# Patient Record
Sex: FEMALE | Race: WHITE | HISPANIC OR LATINO | Employment: FULL TIME | URBAN - METROPOLITAN AREA
[De-identification: names, ages, dates, MRNs, and addresses within clinical notes are randomized per-mention and may not be internally consistent; named-entity substitution may affect disease eponyms.]

---

## 2017-01-03 ENCOUNTER — HOSPITAL ENCOUNTER (EMERGENCY)
Facility: MEDICAL CENTER | Age: 27
End: 2017-01-03
Attending: EMERGENCY MEDICINE
Payer: COMMERCIAL

## 2017-01-03 ENCOUNTER — APPOINTMENT (OUTPATIENT)
Dept: RADIOLOGY | Facility: MEDICAL CENTER | Age: 27
End: 2017-01-03
Attending: EMERGENCY MEDICINE
Payer: COMMERCIAL

## 2017-01-03 VITALS
HEIGHT: 64 IN | SYSTOLIC BLOOD PRESSURE: 135 MMHG | WEIGHT: 145.06 LBS | RESPIRATION RATE: 16 BRPM | OXYGEN SATURATION: 96 % | TEMPERATURE: 98.5 F | DIASTOLIC BLOOD PRESSURE: 77 MMHG | BODY MASS INDEX: 24.77 KG/M2 | HEART RATE: 75 BPM

## 2017-01-03 DIAGNOSIS — O20.0 THREATENED MISCARRIAGE IN EARLY PREGNANCY: ICD-10-CM

## 2017-01-03 LAB
ANION GAP SERPL CALC-SCNC: 9 MMOL/L (ref 0–11.9)
APPEARANCE UR: CLEAR
B-HCG SERPL-ACNC: 110.4 MIU/ML (ref 0–10)
BACTERIA #/AREA URNS HPF: ABNORMAL /HPF
BASOPHILS # BLD AUTO: 0.3 % (ref 0–1.8)
BASOPHILS # BLD: 0.03 K/UL (ref 0–0.12)
BILIRUB UR QL STRIP.AUTO: NEGATIVE
BUN SERPL-MCNC: 5 MG/DL (ref 8–22)
CALCIUM SERPL-MCNC: 8.8 MG/DL (ref 8.4–10.2)
CHLORIDE SERPL-SCNC: 106 MMOL/L (ref 96–112)
CO2 SERPL-SCNC: 24 MMOL/L (ref 20–33)
COLOR UR: YELLOW
CREAT SERPL-MCNC: 0.77 MG/DL (ref 0.5–1.4)
CULTURE IF INDICATED INDCX: NO UA CULTURE
EOSINOPHIL # BLD AUTO: 0.12 K/UL (ref 0–0.51)
EOSINOPHIL NFR BLD: 1.3 % (ref 0–6.9)
EPI CELLS #/AREA URNS HPF: ABNORMAL /HPF
ERYTHROCYTE [DISTWIDTH] IN BLOOD BY AUTOMATED COUNT: 40.2 FL (ref 35.9–50)
GFR SERPL CREATININE-BSD FRML MDRD: >60 ML/MIN/1.73 M 2
GLUCOSE SERPL-MCNC: 89 MG/DL (ref 65–99)
GLUCOSE UR STRIP.AUTO-MCNC: NEGATIVE MG/DL
HCT VFR BLD AUTO: 41.3 % (ref 37–47)
HGB BLD-MCNC: 14.3 G/DL (ref 12–16)
IMM GRANULOCYTES # BLD AUTO: 0.02 K/UL (ref 0–0.11)
IMM GRANULOCYTES NFR BLD AUTO: 0.2 % (ref 0–0.9)
KETONES UR STRIP.AUTO-MCNC: NEGATIVE MG/DL
LEUKOCYTE ESTERASE UR QL STRIP.AUTO: NEGATIVE
LYMPHOCYTES # BLD AUTO: 2.42 K/UL (ref 1–4.8)
LYMPHOCYTES NFR BLD: 25.6 % (ref 22–41)
MCH RBC QN AUTO: 30.4 PG (ref 27–33)
MCHC RBC AUTO-ENTMCNC: 34.6 G/DL (ref 33.6–35)
MCV RBC AUTO: 87.9 FL (ref 81.4–97.8)
MICRO URNS: ABNORMAL
MONOCYTES # BLD AUTO: 0.68 K/UL (ref 0–0.85)
MONOCYTES NFR BLD AUTO: 7.2 % (ref 0–13.4)
MUCOUS THREADS #/AREA URNS HPF: ABNORMAL /HPF
NEUTROPHILS # BLD AUTO: 6.2 K/UL (ref 2–7.15)
NEUTROPHILS NFR BLD: 65.4 % (ref 44–72)
NITRITE UR QL STRIP.AUTO: NEGATIVE
NRBC # BLD AUTO: 0 K/UL
NRBC BLD AUTO-RTO: 0 /100 WBC
NUMBER OF RH DOSES IND 8505RD: NORMAL
PH UR STRIP.AUTO: 5.5 [PH]
PLATELET # BLD AUTO: 301 K/UL (ref 164–446)
PMV BLD AUTO: 10 FL (ref 9–12.9)
POTASSIUM SERPL-SCNC: 3.6 MMOL/L (ref 3.6–5.5)
PROT UR QL STRIP: NEGATIVE MG/DL
RBC # BLD AUTO: 4.7 M/UL (ref 4.2–5.4)
RBC # URNS HPF: ABNORMAL /HPF
RBC UR QL AUTO: ABNORMAL
RH BLD: NORMAL
SODIUM SERPL-SCNC: 139 MMOL/L (ref 135–145)
SP GR UR STRIP.AUTO: 1.02
WBC # BLD AUTO: 9.5 K/UL (ref 4.8–10.8)
WBC #/AREA URNS HPF: ABNORMAL /HPF

## 2017-01-03 PROCEDURE — 76830 TRANSVAGINAL US NON-OB: CPT

## 2017-01-03 PROCEDURE — 86901 BLOOD TYPING SEROLOGIC RH(D): CPT

## 2017-01-03 PROCEDURE — 36415 COLL VENOUS BLD VENIPUNCTURE: CPT

## 2017-01-03 PROCEDURE — 81001 URINALYSIS AUTO W/SCOPE: CPT

## 2017-01-03 PROCEDURE — 99284 EMERGENCY DEPT VISIT MOD MDM: CPT

## 2017-01-03 PROCEDURE — 85025 COMPLETE CBC W/AUTO DIFF WBC: CPT

## 2017-01-03 PROCEDURE — 84702 CHORIONIC GONADOTROPIN TEST: CPT

## 2017-01-03 PROCEDURE — 80048 BASIC METABOLIC PNL TOTAL CA: CPT

## 2017-01-03 ASSESSMENT — PAIN SCALES - GENERAL: PAINLEVEL_OUTOF10: ASSUMED PAIN PRESENT

## 2017-01-03 NOTE — ED AVS SNAPSHOT
AgLocal Access Code: Activation code not generated  Current AgLocal Status: Active    [employer]  A secure, online tool to manage your health information     [employer]’s AgLocal® is a secure, online tool that connects you to your personalized health information from the privacy of your home -- day or night - making it very easy for you to manage your healthcare. Once the activation process is completed, you can even access your medical information using the AgLocal ko, which is available for free in the Apple Ko store or Google Play store.     AgLocal provides the following levels of access (as shown below):   My Chart Features   [facility] Primary Care Doctor [facility]  Specialists [facility]  Urgent  Care Non-[facility]  Primary Care  Doctor   Email your healthcare team securely and privately 24/7 X X X X   Manage appointments: schedule your next appointment; view details of past/upcoming appointments X      Request prescription refills. X      View recent personal medical records, including lab and immunizations X X X X   View health record, including health history, allergies, medications X X X X   Read reports about your outpatient visits, procedures, consult and ER notes X X X X   See your discharge summary, which is a recap of your hospital and/or ER visit that includes your diagnosis, lab results, and care plan. X X       How to register for AgLocal:  1. Go to  [email or web address].  2. Click on the Sign Up Now box, which takes you to the New Member Sign Up page. You will need to provide the following information:  a. Enter your AgLocal Access Code exactly as it appears at the top of this page. (You will not need to use this code after you’ve completed the sign-up process. If you do not sign up before the expiration date, you must request a new code.)   b. Enter your date of birth.   c. Enter your home email address.   d. Click Submit, and follow the next screen’s instructions.  3. Create a AgLocal ID. This will  be your Oscar login ID and cannot be changed, so think of one that is secure and easy to remember.  4. Create a Oscar password. You can change your password at any time.  5. Enter your Password Reset Question and Answer. This can be used at a later time if you forget your password.   6. Enter your e-mail address. This allows you to receive e-mail notifications when new information is available in Oscar.  7. Click Sign Up. You can now view your health information.    For assistance activating your Oscar account, call [phone]

## 2017-01-03 NOTE — ED AVS SNAPSHOT
After Visit Summary                                                                                                                Carol Buitrago   MRN: [id number]    Department:  [facility], Emergency Dept   Date of Visit:  1/3/2017            [facility], Emergency Dept    [address]    [place] [address]    Phone:  [phone]      You were seen by     Isrrael Hernandez M.D.      Your Diagnosis Was     Threatened miscarriage in early pregnancy     O20.0       Follow-up Information     1. Follow up with Skip Saxena M.D.. Schedule an appointment as soon as possible for a visit in 2 days.    Specialty:  OB/Gyn    Contact information    [address]  [place] [address] [phone]        Medication Information     Review all of your home medications and newly ordered medications with your primary doctor and/or pharmacist as soon as possible. Follow medication instructions as directed by your doctor and/or pharmacist.     Please keep your complete medication list with you and share with your physician. Update the information when medications are discontinued, doses are changed, or new medications (including over-the-counter products) are added; and carry medication information at all times in the event of emergency situations.               Medication List      ASK your doctor about these medications        Instructions    SRONYX 0.1-20 MG-MCG per tablet   Generic drug:  levonorgestrel-ethinyl estradiol    Take 1 Tab by mouth every day.   Dose:  1 Tab               Procedures and tests performed during your visit     BASIC METABOLIC PANEL    CBC WITH DIFFERENTIAL    ESTIMATED GFR    HCG QUANTITATIVE SERUM    IV Saline Lock    RH TYPE FOR RHOGAM FROM E.D.    Set Up for Pelvic Exam    URINALYSIS,CULTURE IF INDICATED    URINE MICROSCOPIC (W/UA)    US-GYN-PELVIS TRANSVAGINAL        Discharge Instructions       Threatened Miscarriage  A threatened  miscarriage is when you have vaginal bleeding during your first 20 weeks of pregnancy but the pregnancy has not ended. Your doctor will do tests to make sure you are still pregnant. The cause of the bleeding may not be known. This condition does not mean your pregnancy will end. It does increase the risk of it ending (complete miscarriage).  HOME CARE   · Make sure you keep all your doctor visits for prenatal care.  · Get plenty of rest.  · Do not have sex or use tampons if you have vaginal bleeding.  · Do not douche.  · Do not smoke or use drugs.  · Do not drink alcohol.  · Avoid caffeine.  GET HELP IF:  · You have light bleeding from your vagina.  · You have belly pain or cramping.  · You have a fever.  GET HELP RIGHT AWAY IF:   · You have heavy bleeding from your vagina.  · You have clots of blood coming from your vagina.  · You have bad pain or cramps in your low back or belly.  · You have fever, chills, and bad belly pain.  MAKE SURE YOU:   · Understand these instructions.  · Will watch your condition.  · Will get help right away if you are not doing well or get worse.     This information is not intended to replace advice given to you by your health care provider. Make sure you discuss any questions you have with your health care provider.     Document Released: 11/30/2009 Document Revised: 12/23/2014 Document Reviewed: 10/14/2014  [employer] Interactive Patient Education ©2016 [employer]    Vaginal Bleeding During Pregnancy, First Trimester  A small amount of bleeding (spotting) from the vagina is relatively common in early pregnancy. It usually stops on its own. Various things may cause bleeding or spotting in early pregnancy. Some bleeding may be related to the pregnancy, and some may not. In most cases, the bleeding is normal and is not a problem. However, bleeding can also be a sign of something serious. Be sure to tell your health care provider about any vaginal bleeding right away.  Some possible  causes of vaginal bleeding during the first trimester include:  · Infection or inflammation of the cervix.  · Growths (polyps) on the cervix.  · Miscarriage or threatened miscarriage.  · Pregnancy tissue has developed outside of the uterus and in a fallopian tube (tubal pregnancy).  · Tiny cysts have developed in the uterus instead of pregnancy tissue (molar pregnancy).  HOME CARE INSTRUCTIONS   Watch your condition for any changes. The following actions may help to lessen any discomfort you are feeling:  · Follow your health care provider's instructions for limiting your activity. If your health care provider orders bed rest, you may need to stay in bed and only get up to use the bathroom. However, your health care provider may allow you to continue light activity.  · If needed, make plans for someone to help with your regular activities and responsibilities while you are on bed rest.  · Keep track of the number of pads you use each day, how often you change pads, and how soaked (saturated) they are. Write this down.  · Do not use tampons. Do not douche.  · Do not have sexual intercourse or orgasms until approved by your health care provider.  · If you pass any tissue from your vagina, save the tissue so you can show it to your health care provider.  · Only take over-the-counter or prescription medicines as directed by your health care provider.  · Do not take aspirin because it can make you bleed.  · Keep all follow-up appointments as directed by your health care provider.  SEEK MEDICAL CARE IF:  · You have any vaginal bleeding during any part of your pregnancy.  · You have cramps or labor pains.  · You have a fever, not controlled by medicine.  SEEK IMMEDIATE MEDICAL CARE IF:   · You have severe cramps in your back or belly (abdomen).  · You pass large clots or tissue from your vagina.  · Your bleeding increases.  · You feel light-headed or weak, or you have fainting episodes.  · You have chills.  · You are  leaking fluid or have a gush of fluid from your vagina.  · You pass out while having a bowel movement.  MAKE SURE YOU:  · Understand these instructions.  · Will watch your condition.  · Will get help right away if you are not doing well or get worse.     This information is not intended to replace advice given to you by your health care provider. Make sure you discuss any questions you have with your health care provider.     Document Released: 2006 Document Revised: 2014 Document Reviewed: 2014  [employer] Interactive Patient Education ©6 [employer]    Pelvic Rest  Pelvic rest is sometimes recommended for women when:   · The placenta is partially or completely covering the opening of the cervix (placenta previa).  · There is bleeding between the uterine wall and the amniotic sac in the first trimester (subchorionic hemorrhage).  · The cervix begins to open without labor starting (incompetent cervix, cervical insufficiency).  · The labor is too early ( labor).  HOME CARE INSTRUCTIONS  · Do not have sexual intercourse, stimulation, or an orgasm.  · Do not use tampons, douche, or put anything in the vagina.  · Do not lift anything over 10 pounds (4.5 kg).  · Avoid strenuous activity or straining your pelvic muscles.  SEEK MEDICAL CARE IF:   · You have any vaginal bleeding during pregnancy. Treat this as a potential emergency.  · You have cramping pain felt low in the stomach (stronger than menstrual cramps).  · You notice vaginal discharge (watery, mucus, or bloody).  · You have a low, dull backache.  · There are regular contractions or uterine tightening.  SEEK IMMEDIATE MEDICAL CARE IF:  You have vaginal bleeding and have placenta previa.      This information is not intended to replace advice given to you by your health care provider. Make sure you discuss any questions you have with your health care provider.     Document Released: 2012 Document Revised: 2013 Document  Reviewed: 04/13/2012  [employer] Interactive Patient Education ©2016 [employer]            Patient Information     Patient Information    Following emergency treatment: all patient requiring follow-up care must return either to a private physician or a clinic if your condition worsens before you are able to obtain further medical attention, please return to the emergency room.     Billing Information    At [facility], we work to make the billing process streamlined for our patients.  Our Representatives are here to answer any questions you may have regarding your hospital bill.  If you have insurance coverage and have supplied your insurance information to us, we will submit a claim to your insurer on your behalf.  Should you have any questions regarding your bill, we can be reached online or by phone as follows:  Online: You are able pay your bills online or live chat with our representatives about any billing questions you may have. We are here to help Monday - Friday from 8:00am to 7:30pm and 9:00am - 12:00pm on Saturdays.  Please visit [email or web address]  for more information.   Phone:  [phone] or [phone]    Please note that your emergency physician, surgeon, pathologist, radiologist, anesthesiologist, and other specialists are not employed by [facility] and will therefore bill separately for their services.  Please contact them directly for any questions concerning their bills at the numbers below:     Emergency Physician Services:  [phone]  [facility]:  [phone]  [facility]:  [phone]  [facility]:  [phone]    1. Your final bill may vary from the amount quoted upon discharge if all procedures are not complete at that time, or if your doctor has additional procedures of which we are not aware. You will receive an additional bill if you return to the Emergency Department at [facility] for suture  removal regardless of the facility of which the sutures were placed.     2. Please arrange for settlement of this account at the emergency registration.    3. All self-pay accounts are due in full at the time of treatment.  If you are unable to meet this obligation then payment is expected within 4-5 days.     4. If you have had radiology studies (CT, X-ray, Ultrasound, MRI), you have received a preliminary result during your emergency department visit. Please contact the radiology department [phone] to receive a copy of your final result. Please discuss the Final result with your primary physician or with the follow up physician provided.     Crisis Hotline:  [place] Crisis Hotline:  6-230-TUDSENK or 1-944.685.3319  [place] Crisis Hotline:    1-225.335.3559 or 328-939-8019         ED Discharge Follow Up Questions    1. In order to provide you with very good care, we would like to follow up with a phone call in the next few days.  May we have your permission to contact you?     YES /  NO    2. What is the best phone number to call you? (       )_____-__________    3. What is the best time to call you?      Morning  /  Afternoon  /  Evening                   Patient Signature:  ____________________________________________________________    Date:  ____________________________________________________________      Your appointments     Jan 30, 2017  4:15 PM   GYN Visit with Desiree Brooks M.D.   [facility] ([address])    [address] [place] [address], [address]  [place] [address]   [phone]

## 2017-01-03 NOTE — ED AVS SNAPSHOT
1/3/2017          Carol Buitrago  [address] [place] [address]    Dear Carol:    [facility] wants to ensure your discharge home is safe and you or your loved ones have had all your questions answered regarding your care after you leave the hospital.    You may receive a telephone call within two days of your discharge.  This call is to make certain you understand your discharge instructions as well as ensure we provided you with the best care possible during your stay with us.     The call will only last approximately 3-5 minutes and will be done by a nurse.    Once again, we want to ensure your discharge home is safe and that you have a clear understanding of any next steps in your care.  If you have any questions or concerns, please do not hesitate to contact us, we are here for you.  Thank you for choosing [facility] for your healthcare needs.    Sincerely,    Fabrizio Mazariegos    [facility]

## 2017-01-04 NOTE — ED NOTES
Dc instructions given . Pt to f/u with ob as well as have labs drawn prior . To return to regional for continued bleeding

## 2017-01-04 NOTE — ED PROVIDER NOTES
"ED Provider Note    CHIEF COMPLAINT  Chief Complaint   Patient presents with   • Pregnancy   • Vaginal Bleeding   • Cramping       HPI  Carol Buitrago is a 26 y.o. female who presents with vaginal bleeding and pain. The patient states that she is pregnant. Her last menstrual period was .  Having cramping and bleeding.      Last menstrual period       REVIEW OF SYSTEMS  CONSTITUTIONAL:  Denies fever, chills, weight gain or weight loss or weakness  EYES:  Denies photophobia   ENT:  Denies sore throat,  CARDIOVASCULAR:  Denies chest pain, palpitations  RESPIRATORY:  Denies cough or shortness of breath or difficulty breathing  GI: Positive suprapubic pain but no upper abdominal pain. No vomiting or diarrhea.  Genitourinary: Positive vaginal bleeding   MUSCULOSKELETAL:  Denies weakness joint swelling or back pain  SKIN:  No rash  ALLERGIC: No itchy rashes.  NEUROLOGIC:  Denies headache  PSYCHIATRIC:  Denies depression, suicidal ideation or homicial ideation      PAST MEDICAL HISTORY    1 miscarriage  1 therapeutic     FAMILY HISTORY  Family History   Problem Relation Age of Onset   • Hypertension Father        SOCIAL HISTORY   reports that she has never smoked. She does not have any smokeless tobacco history on file. She reports that she drinks alcohol. She reports that she does not use illicit drugs.    SURGICAL HISTORY  History reviewed. No pertinent past surgical history.    CURRENT MEDICATIONS  No current facility-administered medications for this encounter.    Current outpatient prescriptions:   •  levonorgestrel-ethinyl estradiol (SRONYX) 0.1-20 MG-MCG per tablet, Take 1 Tab by mouth every day., Disp: , Rfl:       ALLERGIES  No Known Allergies    PHYSICAL EXAM  VITAL SIGNS: /77 mmHg  Pulse 73  Temp(Src) 36.9 °C (98.5 °F)  Resp 16  Ht 1.626 m (5' 4\")  Wt 65.8 kg (145 lb 1 oz)  BMI 24.89 kg/m2  SpO2 100%  LMP 2016     Constitutional: Patient is awake alert " person place and time. No acute respiratory distress Well developed, Well nourished, Non-toxic appearance.   HENT: Normocephalic, Atraumatic,  Bilateral external ears normal  Eyes:  Sclera and conjunctiva clear, No discharge.   Neck:  Supple no nuchal rigidity, no thyromegaly or mass. Non-tender  Lymphatic: No supraclavicular  Cardiovascular: Heart is regular rate and rhythm no murmur  Thorax & Lungs: Chest is symmetrical, with good breath sounds. No wheezing or crackles. No respiratory distress,  Abdomen:  Soft, No tenderness no hepatosplenomegaly there is no guarding or rebound, No masses, No pulsatile masses.  Skin: Warm, Dry, no petechia, purpura or rash.   Back: Non tender with palpation  Genitalia:  Pelvic exam with a female nurse present. External female genitalia normal. Vault small amount of blood. Cervical os closed.  Extremities: No  edema.  Non tender.   Musculoskeletal: Good range of motion of her lower extremities  Neurologic: Alert & oriented   Strength is symmetrical in the upper and lower extremities        LABS:  Lab Results   Component Value Date    WBC 9.5 2017    HEMOGLOBIN 14.3 2017    HEMATOCRIT 41.3 2017    PLATELETCT 301 2017    SODIUM 139 2017    POTASSIUM 3.6 2017    CHLORIDE 106 2017    CO2 24 2017    BUN 5* 2017    GLUCOSE 89 2017     Quantitative beta hCG 110  Rh+  Urine blood only no signs of infection    RADIOLOGY/PROCEDURES  US-GYN-PELVIS TRANSVAGINAL   Final Result      Echogenic material in the endocervical canal most likely indicates hemorrhage, favored over polyp.      No intrauterine pregnancy is identified.  Differential considerations include missed , early gestation and nonvisualized ectopic pregnancy.  Consequently, close clinical monitoring is advised.            COURSE & MEDICAL DECISION MAKING  Pertinent Labs & Imaging studies reviewed. (See chart for details)  Differential diagnosis includes but not  limited to ectopic pregnancy threatened miscarriage, blighted ovum.    Patient with a cervix is closed. Ultrasound shows no obvious intrauterine pregnancy. Her quantitative hCG is 110. This really does not correlate with a last menstrual period of November 11. Concern of course that she's had a miscarriage or is about to have one. At this time I believe she is stable to be discharged home for close follow-up. I have  Discussed the case with Dr. Saxena. They will see her in follow-up in the office.    FINAL IMPRESSION  1. Threatened miscarriage versus early ectopic pregnancy       PLAN  1. Follow up Dr. Saxena call tomorrow for an appointment within 2 days  2. Repeat quantitative hCG in 48 hours  3. Right miscarriage information sheet  4. Return to the emergency department for increased pains, fevers, vomiting or change in condition.  Electronically signed by: Isrrael Hernandez, 1/3/2017 7:35 PM

## 2017-01-04 NOTE — DISCHARGE INSTRUCTIONS
Threatened Miscarriage  A threatened miscarriage is when you have vaginal bleeding during your first 20 weeks of pregnancy but the pregnancy has not ended. Your doctor will do tests to make sure you are still pregnant. The cause of the bleeding may not be known. This condition does not mean your pregnancy will end. It does increase the risk of it ending (complete miscarriage).  HOME CARE   · Make sure you keep all your doctor visits for prenatal care.  · Get plenty of rest.  · Do not have sex or use tampons if you have vaginal bleeding.  · Do not douche.  · Do not smoke or use drugs.  · Do not drink alcohol.  · Avoid caffeine.  GET HELP IF:  · You have light bleeding from your vagina.  · You have belly pain or cramping.  · You have a fever.  GET HELP RIGHT AWAY IF:   · You have heavy bleeding from your vagina.  · You have clots of blood coming from your vagina.  · You have bad pain or cramps in your low back or belly.  · You have fever, chills, and bad belly pain.  MAKE SURE YOU:   · Understand these instructions.  · Will watch your condition.  · Will get help right away if you are not doing well or get worse.     This information is not intended to replace advice given to you by your health care provider. Make sure you discuss any questions you have with your health care provider.     Document Released: 11/30/2009 Document Revised: 12/23/2014 Document Reviewed: 10/14/2014  [employer] Interactive Patient Education ©2016 [employer]    Vaginal Bleeding During Pregnancy, First Trimester  A small amount of bleeding (spotting) from the vagina is relatively common in early pregnancy. It usually stops on its own. Various things may cause bleeding or spotting in early pregnancy. Some bleeding may be related to the pregnancy, and some may not. In most cases, the bleeding is normal and is not a problem. However, bleeding can also be a sign of something serious. Be sure to tell your health care provider about any vaginal  bleeding right away.  Some possible causes of vaginal bleeding during the first trimester include:  · Infection or inflammation of the cervix.  · Growths (polyps) on the cervix.  · Miscarriage or threatened miscarriage.  · Pregnancy tissue has developed outside of the uterus and in a fallopian tube (tubal pregnancy).  · Tiny cysts have developed in the uterus instead of pregnancy tissue (molar pregnancy).  HOME CARE INSTRUCTIONS   Watch your condition for any changes. The following actions may help to lessen any discomfort you are feeling:  · Follow your health care provider's instructions for limiting your activity. If your health care provider orders bed rest, you may need to stay in bed and only get up to use the bathroom. However, your health care provider may allow you to continue light activity.  · If needed, make plans for someone to help with your regular activities and responsibilities while you are on bed rest.  · Keep track of the number of pads you use each day, how often you change pads, and how soaked (saturated) they are. Write this down.  · Do not use tampons. Do not douche.  · Do not have sexual intercourse or orgasms until approved by your health care provider.  · If you pass any tissue from your vagina, save the tissue so you can show it to your health care provider.  · Only take over-the-counter or prescription medicines as directed by your health care provider.  · Do not take aspirin because it can make you bleed.  · Keep all follow-up appointments as directed by your health care provider.  SEEK MEDICAL CARE IF:  · You have any vaginal bleeding during any part of your pregnancy.  · You have cramps or labor pains.  · You have a fever, not controlled by medicine.  SEEK IMMEDIATE MEDICAL CARE IF:   · You have severe cramps in your back or belly (abdomen).  · You pass large clots or tissue from your vagina.  · Your bleeding increases.  · You feel light-headed or weak, or you have fainting  episodes.  · You have chills.  · You are leaking fluid or have a gush of fluid from your vagina.  · You pass out while having a bowel movement.  MAKE SURE YOU:  · Understand these instructions.  · Will watch your condition.  · Will get help right away if you are not doing well or get worse.     This information is not intended to replace advice given to you by your health care provider. Make sure you discuss any questions you have with your health care provider.     Document Released: 2006 Document Revised: 2014 Document Reviewed: 2014  [employer] Interactive Patient Education © [employer]    Pelvic Rest  Pelvic rest is sometimes recommended for women when:   · The placenta is partially or completely covering the opening of the cervix (placenta previa).  · There is bleeding between the uterine wall and the amniotic sac in the first trimester (subchorionic hemorrhage).  · The cervix begins to open without labor starting (incompetent cervix, cervical insufficiency).  · The labor is too early ( labor).  HOME CARE INSTRUCTIONS  · Do not have sexual intercourse, stimulation, or an orgasm.  · Do not use tampons, douche, or put anything in the vagina.  · Do not lift anything over 10 pounds (4.5 kg).  · Avoid strenuous activity or straining your pelvic muscles.  SEEK MEDICAL CARE IF:   · You have any vaginal bleeding during pregnancy. Treat this as a potential emergency.  · You have cramping pain felt low in the stomach (stronger than menstrual cramps).  · You notice vaginal discharge (watery, mucus, or bloody).  · You have a low, dull backache.  · There are regular contractions or uterine tightening.  SEEK IMMEDIATE MEDICAL CARE IF:  You have vaginal bleeding and have placenta previa.      This information is not intended to replace advice given to you by your health care provider. Make sure you discuss any questions you have with your health care provider.     Document Released: 2012  Document Revised: 03/11/2013 Document Reviewed: 04/13/2012  [employer] Interactive Patient Education ©2016 [employer].

## 2017-01-04 NOTE — ED NOTES
Assumed care of pt in room-states vag bleeding since . In no apparent distress, urine to lab. Is G=3, P=0, with 1  at 18, 1 miscarriage

## 2017-01-30 ENCOUNTER — GYNECOLOGY VISIT (OUTPATIENT)
Dept: OBGYN | Facility: CLINIC | Age: 27
End: 2017-01-30
Payer: COMMERCIAL

## 2017-01-30 ENCOUNTER — HOSPITAL ENCOUNTER (OUTPATIENT)
Facility: MEDICAL CENTER | Age: 27
End: 2017-01-30
Attending: OBSTETRICS & GYNECOLOGY
Payer: COMMERCIAL

## 2017-01-30 VITALS
WEIGHT: 144 LBS | HEIGHT: 65 IN | SYSTOLIC BLOOD PRESSURE: 122 MMHG | DIASTOLIC BLOOD PRESSURE: 82 MMHG | BODY MASS INDEX: 23.99 KG/M2

## 2017-01-30 DIAGNOSIS — O03.9 SAB (SPONTANEOUS ABORTION): ICD-10-CM

## 2017-01-30 LAB
INT CON NEG: NEGATIVE
INT CON POS: POSITIVE
POC URINE PREGNANCY TEST: NEGATIVE

## 2017-01-30 PROCEDURE — 81025 URINE PREGNANCY TEST: CPT | Performed by: OBSTETRICS & GYNECOLOGY

## 2017-01-30 PROCEDURE — 87491 CHLMYD TRACH DNA AMP PROBE: CPT

## 2017-01-30 PROCEDURE — 99213 OFFICE O/P EST LOW 20 MIN: CPT | Performed by: OBSTETRICS & GYNECOLOGY

## 2017-01-30 PROCEDURE — 87591 N.GONORRHOEAE DNA AMP PROB: CPT

## 2017-01-30 ASSESSMENT — ENCOUNTER SYMPTOMS
NAUSEA: 0
COUGH: 0
CHILLS: 0
MYALGIAS: 0
DEPRESSION: 0
BRUISES/BLEEDS EASILY: 0
HEARTBURN: 0
DIZZINESS: 0
HEADACHES: 0
BLURRED VISION: 0
VOMITING: 0
FEVER: 0

## 2017-01-30 NOTE — MR AVS SNAPSHOT
"        Carol Buitrago   2017 4:15 PM   Gynecology Visit   MRN: [id number]    Department:  [facility]   Dept Phone:  [phone]    Description:  Female : 1990   Provider:  Desiree Brooks M.D.           Allergies as of 2017     No Known Allergies      You were diagnosed with     SAB (spontaneous )   [[id number]]         Vital Signs     Blood Pressure Height Weight Body Mass Index Last Menstrual Period Smoking Status    122/82 mmHg 1.651 m (5' 5\") 65.318 kg (144 lb) 23.96 kg/m2 10/11/2016 Never Smoker       Basic Information     Date Of Birth Sex Race Ethnicity Preferred Language    1990 Female Unknown, White  Origin (Botswanan,Ethiopian,Djiboutian,Belarusian, etc) English      Health Maintenance        Date Due Completion Dates    IMM HEP B VACCINE (1 of 3 - Primary Series) 1992 ---    IMM HEP A VACCINE (1 of 2 - Standard Series) 1993 ---    IMM HPV VACCINE (1 of 3 - Female 3 Dose Series) 2003 ---    IMM VARICELLA (CHICKENPOX) VACCINE (1 of 2 - 2 Dose Adolescent Series) 2005 ---    IMM DTaP/Tdap/Td Vaccine (1 - Tdap) 2011 ---    IMM INFLUENZA (1) 2016 ---    PAP SMEAR 2019            Results     POCT Pregnancy      Component Value Standard Range & Units    POC Urine Pregnancy Test NEGATIVE Negative    Internal Control Positive Positive     Internal Control Negative Negative                         Current Immunizations     No immunizations on file.      Below and/or attached are the medications your provider expects you to take. Review all of your home medications and newly ordered medications with your provider and/or pharmacist. Follow medication instructions as directed by your provider and/or pharmacist. Please keep your medication list with you and share with your provider. Update the information when medications are discontinued, doses are changed, or new medications (including over-the-counter products) are added; and carry " medication information at all times in the event of emergency situations     Allergies:  No Known Allergies          Medications  Valid as of: January 30, 2017 -  5:32 PM    Generic Name Brand Name Tablet Size Instructions for use    Levonorgestrel-Ethinyl Estrad (Tab) CHRISTOPHER CAMARA LESSINA 0.1-20 MG-MCG Take 1 Tab by mouth every day.        .                 Medicines prescribed today were sent to:     [facility]/PHARMACY #[id number] - [place], [place] - [address]    [address] [facility] [place] [address]    Phone: [phone] Fax: [phone]    Open 24 Hours?: No      Medication refill instructions:       If your prescription bottle indicates you have medication refills left, it is not necessary to call your provider’s office. Please contact your pharmacy and they will refill your medication.    If your prescription bottle indicates you do not have any refills left, you may request refills at any time through one of the following ways: The online [employer] system (except Urgent Care), by calling your provider’s office, or by asking your pharmacy to contact your provider’s office with a refill request. Medication refills are processed only during regular business hours and may not be available until the next business day. Your provider may request additional information or to have a follow-up visit with you prior to refilling your medication.   *Please Note: Medication refills are assigned a new Rx number when refilled electronically. Your pharmacy may indicate that no refills were authorized even though a new prescription for the same medication is available at the pharmacy. Please request the medicine by name with the pharmacy before contacting your provider for a refill.        Your To Do List     Future Labs/Procedures Complete By Expires    Chlamydia/GC PCR Urine or Swab  As directed 1/30/2018         [employer] Access Code: Activation code not generated  Current [employer] Status: Active

## 2017-01-31 LAB
C TRACH DNA GENITAL QL NAA+PROBE: NEGATIVE
N GONORRHOEA DNA GENITAL QL NAA+PROBE: NEGATIVE
SPECIMEN SOURCE: NORMAL

## 2017-01-31 NOTE — PROGRESS NOTES
"Subjective:      Carol Buitrago is a 26 y.o. female who presents with No chief complaint on file.            HPI   26-year-old  020 menstrual period of . Patient went to the emergency room on January 3 because she started having heavy bleeding  through the seventh. At that time she had a beta hCG performed that was 110 as well as an ultrasound which showed no IUP. Patient not have any follow-up lab work done. Is here today for evaluation and management. Patient is currently without complaints she's had no vaginal bleeding pelvic pain or pressure. No fevers nausea or vomiting.    GYN history no history of abnormal Pap smear, last Pap 2016, no history of sexually transmitted C is    Review of Systems   Constitutional: Negative for fever and chills.   Eyes: Negative for blurred vision.   Respiratory: Negative for cough.    Cardiovascular: Negative for chest pain.   Gastrointestinal: Negative for heartburn, nausea and vomiting.   Genitourinary: Negative for dysuria.   Musculoskeletal: Negative for myalgias.   Skin: Negative for rash.   Neurological: Negative for dizziness and headaches.   Endo/Heme/Allergies: Does not bruise/bleed easily.   Psychiatric/Behavioral: Negative for depression.          Objective:     /82 mmHg  Ht 1.651 m (5' 5\")  Wt 65.318 kg (144 lb)  BMI 23.96 kg/m2  LMP 10/11/2016     Physical Exam   Constitutional: She is oriented to person, place, and time. She appears well-developed and well-nourished.   Neck: Normal range of motion. Neck supple.   Cardiovascular: Normal rate.    Pulmonary/Chest: Effort normal and breath sounds normal. Right breast exhibits no inverted nipple, no mass, no nipple discharge, no skin change and no tenderness. Left breast exhibits no inverted nipple, no mass, no nipple discharge, no skin change and no tenderness. Breasts are symmetrical. There is no breast swelling.   Abdominal: Soft. Bowel sounds are normal.   Genitourinary: Rectal " exam shows no external hemorrhoid. No breast tenderness, discharge or bleeding. Pelvic exam was performed with patient supine. No labial fusion. There is no rash, tenderness, lesion or injury on the right labia. There is no rash, tenderness, lesion or injury on the left labia. Uterus is not deviated, not enlarged, not fixed and not tender. Cervix exhibits no motion tenderness, no discharge and no friability. Right adnexum displays no mass, no tenderness and no fullness. Left adnexum displays no mass, no tenderness and no fullness. No erythema, tenderness or bleeding in the vagina. No foreign body around the vagina. No signs of injury around the vagina. No vaginal discharge found.   Lymphadenopathy:        Right: No inguinal adenopathy present.        Left: No inguinal adenopathy present.   Neurological: She is alert and oriented to person, place, and time.   Skin: Skin is warm and dry.   Psychiatric: She has a normal mood and affect.   Nursing note and vitals reviewed.         Transvaginal ultrasound was performed and read by me that shows a normal uterus with no IUP with a normal endometrial stripe, left ovary is visualized with follicular cysts, right ovary not visualized no free fluid noted in the cul-de-sac consistent with spontaneous      Assessment/Plan:     1. SAB (spontaneous )  Urine pregnancy test today, negative  Most likely spontaneous  resolved  GC chlamydia testing performed  Follow-up one year for annual gynecologic exam

## 2017-06-23 ENCOUNTER — HOSPITAL ENCOUNTER (OUTPATIENT)
Facility: MEDICAL CENTER | Age: 27
End: 2017-06-23
Attending: OBSTETRICS & GYNECOLOGY
Payer: COMMERCIAL

## 2017-06-23 ENCOUNTER — GYNECOLOGY VISIT (OUTPATIENT)
Dept: OBGYN | Facility: CLINIC | Age: 27
End: 2017-06-23
Payer: COMMERCIAL

## 2017-06-23 VITALS
HEIGHT: 65 IN | WEIGHT: 140 LBS | BODY MASS INDEX: 23.32 KG/M2 | SYSTOLIC BLOOD PRESSURE: 110 MMHG | DIASTOLIC BLOOD PRESSURE: 62 MMHG

## 2017-06-23 DIAGNOSIS — Z34.82 ENCOUNTER FOR SUPERVISION OF OTHER NORMAL PREGNANCY IN SECOND TRIMESTER: ICD-10-CM

## 2017-06-23 DIAGNOSIS — N93.8 DUB (DYSFUNCTIONAL UTERINE BLEEDING): ICD-10-CM

## 2017-06-23 LAB — IN CLINIC OB SCAN: NORMAL

## 2017-06-23 PROCEDURE — 87491 CHLMYD TRACH DNA AMP PROBE: CPT

## 2017-06-23 PROCEDURE — 76856 US EXAM PELVIC COMPLETE: CPT | Performed by: OBSTETRICS & GYNECOLOGY

## 2017-06-23 PROCEDURE — 99213 OFFICE O/P EST LOW 20 MIN: CPT | Mod: 25 | Performed by: OBSTETRICS & GYNECOLOGY

## 2017-06-23 PROCEDURE — 87591 N.GONORRHOEAE DNA AMP PROB: CPT

## 2017-06-23 NOTE — MR AVS SNAPSHOT
"        Carol Buitrago   2017 2:00 PM   Gynecology Visit   MRN: [id number]    Department:  [facility]   Dept Phone:  [phone]    Description:  Female : 1990   Provider:  Desiree Brooks M.D.           Allergies as of 2017     No Known Allergies      You were diagnosed with     DUB (dysfunctional uterine bleeding)   [[id number]]       Encounter for supervision of other normal pregnancy in second trimester   [[id number]]         Vital Signs     Blood Pressure Height Weight Body Mass Index Last Menstrual Period Smoking Status    110/62 mmHg 1.651 m (5' 5\") 63.504 kg (140 lb) 23.30 kg/m2 2017 Never Smoker       Basic Information     Date Of Birth Sex Race Ethnicity Preferred Language    1990 Female Unknown, White  Origin (Malian,Guyanese,Uzbek,Serbian, etc) English      Health Maintenance        Date Due Completion Dates    IMM HEP B VACCINE (1 of 3 - Primary Series) 1992 ---    IMM HEP A VACCINE (1 of 2 - Standard Series) 1993 ---    IMM HPV VACCINE (1 of 3 - Female 3 Dose Series) 2003 ---    IMM VARICELLA (CHICKENPOX) VACCINE (1 of 2 - 2 Dose Adolescent Series) 2005 ---    IMM DTaP/Tdap/Td Vaccine (1 - Tdap) 2011 ---    PAP SMEAR 2019            Results     POCT US - In Clinic OB Scan      Component    In Clinic OB Scan                        Current Immunizations     No immunizations on file.      Below and/or attached are the medications your provider expects you to take. Review all of your home medications and newly ordered medications with your provider and/or pharmacist. Follow medication instructions as directed by your provider and/or pharmacist. Please keep your medication list with you and share with your provider. Update the information when medications are discontinued, doses are changed, or new medications (including over-the-counter products) are added; and carry medication information at all times in the event of " emergency situations     Allergies:  No Known Allergies          Medications  Valid as of: June 23, 2017 -  4:28 PM    Generic Name Brand Name Tablet Size Instructions for use    Levonorgestrel-Ethinyl Estrad (Tab) CHRISTOPHER CAMARA LESSINA 0.1-20 MG-MCG Take 1 Tab by mouth every day.        .                 Medicines prescribed today were sent to:     [facility]/PHARMACY #[id number] - [place], [place] - [address]    [address] [facility] [place] [address]    Phone: [phone] Fax: [phone]    Open 24 Hours?: No      Medication refill instructions:       If your prescription bottle indicates you have medication refills left, it is not necessary to call your provider’s office. Please contact your pharmacy and they will refill your medication.    If your prescription bottle indicates you do not have any refills left, you may request refills at any time through one of the following ways: The online [employer] system (except Urgent Care), by calling your provider’s office, or by asking your pharmacy to contact your provider’s office with a refill request. Medication refills are processed only during regular business hours and may not be available until the next business day. Your provider may request additional information or to have a follow-up visit with you prior to refilling your medication.   *Please Note: Medication refills are assigned a new Rx number when refilled electronically. Your pharmacy may indicate that no refills were authorized even though a new prescription for the same medication is available at the pharmacy. Please request the medicine by name with the pharmacy before contacting your provider for a refill.        Your To Do List     Future Labs/Procedures Complete By Expires    AFP TETRA  As directed 6/23/2018    PRENATAL PANEL 3+HIV+UACXI  As directed 6/23/2018    US-OB 2ND 3RD TRI COMPLETE  As directed 6/23/2018    Scheduling Instructions:    3-5 weeks         [employer] Access Code: Activation code not  generated  Current MyChart Status: Active

## 2017-06-23 NOTE — PROGRESS NOTES
"Carol Buitrago,  26 y.o.  female presents today with a C/O of :amenorrhea. Pt   Patient's last menstrual period was 2017.       Subjective : Nausea/Vomiting: Sometimes:  Abdominal /pelvic cramping : No :   vaginal bleeding:No  Feeling well       GYN ROS:  normal menses, no abnormal bleeding, pelvic pain or discharge, no breast pain or new or enlarging lumps on self exam      History reviewed. No pertinent past medical history.    History reviewed. No pertinent past surgical history.    Current Birth control:  none    OB History    Para Term  AB SAB TAB Ectopic Multiple Living   3 0 0 0 2 1 1 0 0 0      # Outcome Date GA Lbr Saad/2nd Weight Sex Delivery Anes PTL Lv   3 Current            2 SAB            1 TAB                       Allergy:      Review of patient's allergies indicates no known allergies.    Exam;    /62 mmHg  Ht 1.651 m (5' 5\")  Wt 63.504 kg (140 lb)  BMI 23.30 kg/m2  LMP 2017  Well-developed well-nourished female in no apparent distress  Heart regular rate and rhythm  Lungs clear to auscultation bilaterally  Breasts bilaterally normal with no dominant masses  Abdomen is soft and nontender    Normal external female genitalia  Cervix is closed thick and high  Uterus  16 centimeters  Adnexa are bilaterally nontender with no dominant masses    Lab.    No results found for this or any previous visit (from the past 336 hour(s)).  Ultrasound :     Second/third trimester findings: BPD: consistent with 15 weeks and 0 days, CHIKI: adequate, Placenta localization: posterior and US JOMAR: 12/15/17  Probe type: abdominal  Fetal presentation: cephalic  Ultrasound was performed and read by me      Assessment:    Intrauterine gestation at 15 weeks and 0 /7 days, with EDC 12/15/17    Plan:  4 weeks  Prenatal labs are ordered    Quad screen and us        "

## 2017-06-24 LAB
C TRACH DNA SPEC QL NAA+PROBE: NEGATIVE
N GONORRHOEA DNA SPEC QL NAA+PROBE: NEGATIVE
SPECIMEN SOURCE: NORMAL

## 2017-07-21 ENCOUNTER — HOSPITAL ENCOUNTER (OUTPATIENT)
Dept: RADIOLOGY | Facility: MEDICAL CENTER | Age: 27
End: 2017-07-21
Attending: OBSTETRICS & GYNECOLOGY
Payer: COMMERCIAL

## 2017-07-21 DIAGNOSIS — Z34.82 ENCOUNTER FOR SUPERVISION OF OTHER NORMAL PREGNANCY IN SECOND TRIMESTER: ICD-10-CM

## 2017-07-21 PROCEDURE — 76805 OB US >/= 14 WKS SNGL FETUS: CPT

## 2017-08-14 ENCOUNTER — ROUTINE PRENATAL (OUTPATIENT)
Dept: OBGYN | Facility: CLINIC | Age: 27
End: 2017-08-14
Payer: COMMERCIAL

## 2017-08-14 VITALS
SYSTOLIC BLOOD PRESSURE: 110 MMHG | DIASTOLIC BLOOD PRESSURE: 66 MMHG | WEIGHT: 148 LBS | HEIGHT: 65 IN | BODY MASS INDEX: 24.66 KG/M2

## 2017-08-14 DIAGNOSIS — Z34.82 PRENATAL CARE, SUBSEQUENT PREGNANCY, SECOND TRIMESTER: ICD-10-CM

## 2017-08-14 PROCEDURE — 90040 PR PRENATAL FOLLOW UP: CPT | Performed by: OBSTETRICS & GYNECOLOGY

## 2017-08-14 NOTE — MR AVS SNAPSHOT
"        Carol Buitrago   2017 1:15 PM   Routine Prenatal   MRN: [id number]    Department:  [facility]   Dept Phone:  [phone]    Description:  Female : 1990   Provider:  Desiree Brooks M.D.           Allergies as of 2017     No Known Allergies      You were diagnosed with     Prenatal care, subsequent pregnancy, second trimester   [[id number]]         Vital Signs     Blood Pressure Height Weight Body Mass Index Last Menstrual Period Smoking Status    110/66 mmHg 1.651 m (5' 5\") 67.132 kg (148 lb) 24.63 kg/m2 2017 Never Smoker       Basic Information     Date Of Birth Sex Race Ethnicity Preferred Language    1990 Female Unknown, White  Origin (Bengali,Italian,Bermudian,Turkish, etc) English      Your appointments     Sep 20, 2017  3:45 PM   OB Follow Up with Desiree Brooks M.D.   [facility] ([address])    [address] [place] [id number] [phone]            Oct 04, 2017  3:45 PM   OB Follow Up with Desiree Brooks M.D.   [facility] ([address])    [address] [place] [id number] [phone]            Oct 18, 2017  3:45 PM   OB Follow Up with Desiree Brooks M.D.   [facility] ([address])    [address] [place] [id number] [phone]              Health Maintenance        Date Due Completion Dates    IMM HEP B VACCINE (1 of 3 - Primary Series) 1992 ---    IMM HEP A VACCINE (1 of 2 - Standard Series) 1993 ---    IMM HPV VACCINE (1 of 3 - Female 3 Dose Series) 2003 ---    IMM VARICELLA (CHICKENPOX) VACCINE (1 of 2 - 2 Dose Adolescent Series) 2005 ---    IMM DTaP/Tdap/Td Vaccine (1 - Tdap) 2011 ---    IMM INFLUENZA (1) 2017 ---    PAP SMEAR 2019            Current Immunizations     No immunizations on file.      Below and/or attached are the medications your provider " expects you to take. Review all of your home medications and newly ordered medications with your provider and/or pharmacist. Follow medication instructions as directed by your provider and/or pharmacist. Please keep your medication list with you and share with your provider. Update the information when medications are discontinued, doses are changed, or new medications (including over-the-counter products) are added; and carry medication information at all times in the event of emergency situations     Allergies:  No Known Allergies          Medications  Valid as of: August 14, 2017 -  2:04 PM    Generic Name Brand Name Tablet Size Instructions for use    Levonorgestrel-Ethinyl Estrad (Tab) CHRISTOPHER CAMARA LESSINA 0.1-20 MG-MCG Take 1 Tab by mouth every day.        .                 Medicines prescribed today were sent to:     [facility]/PHARMACY #[id number] - [place] - [address] [address] [facility] [place] [address]    Phone: [phone] Fax: [phone]    Open 24 Hours?: No      Medication refill instructions:       If your prescription bottle indicates you have medication refills left, it is not necessary to call your provider’s office. Please contact your pharmacy and they will refill your medication.    If your prescription bottle indicates you do not have any refills left, you may request refills at any time through one of the following ways: The online [employer] system (except Urgent Care), by calling your provider’s office, or by asking your pharmacy to contact your provider’s office with a refill request. Medication refills are processed only during regular business hours and may not be available until the next business day. Your provider may request additional information or to have a follow-up visit with you prior to refilling your medication.   *Please Note: Medication refills are assigned a new Rx number when refilled electronically. Your pharmacy may indicate that no refills were authorized even though a new  prescription for the same medication is available at the pharmacy. Please request the medicine by name with the pharmacy before contacting your provider for a refill.           MyChart Access Code: Activation code not generated  Current MyChart Status: Active

## 2017-08-14 NOTE — PROGRESS NOTES
Carol Buitrago is a 26 y.o.  at 22w3d here today for obstetrical visit.  Patient is without complaints.    She reports good fetal movement.  She denies vaginal bleeding.  She denies rupture of membranes.  She denies contractions.      does not have a problem list on file.    Past medical history is reviewed and updated  Past surgical history is reviewed and updated  Medications reviewed and updated  Allergies reviewed and updated  Social history reviewed and updated  Family history reviewed and updated    Discussed with patient need for timing , quad screen asap  Patient did not get labs done did not see one      A/P IUP at 22w3d  AFP no   1 hour glucola needs  Rhogam unknown  GBS     F/U in 4 weeks

## 2017-09-16 ENCOUNTER — HOSPITAL ENCOUNTER (OUTPATIENT)
Dept: LAB | Facility: MEDICAL CENTER | Age: 27
End: 2017-09-16
Attending: OBSTETRICS & GYNECOLOGY
Payer: COMMERCIAL

## 2017-09-16 DIAGNOSIS — Z34.82 ENCOUNTER FOR SUPERVISION OF OTHER NORMAL PREGNANCY IN SECOND TRIMESTER: ICD-10-CM

## 2017-09-16 LAB
ABO GROUP BLD: NORMAL
APPEARANCE UR: ABNORMAL
BACTERIA #/AREA URNS HPF: ABNORMAL /HPF
BASOPHILS # BLD AUTO: 0.3 % (ref 0–1.8)
BASOPHILS # BLD: 0.02 K/UL (ref 0–0.12)
BILIRUB UR QL STRIP.AUTO: NEGATIVE
BLD GP AB SCN SERPL QL: NORMAL
COLOR UR: YELLOW
CULTURE IF INDICATED INDCX: YES UA CULTURE
EOSINOPHIL # BLD AUTO: 0.08 K/UL (ref 0–0.51)
EOSINOPHIL NFR BLD: 1 % (ref 0–6.9)
EPI CELLS #/AREA URNS HPF: ABNORMAL /HPF
ERYTHROCYTE [DISTWIDTH] IN BLOOD BY AUTOMATED COUNT: 43.4 FL (ref 35.9–50)
GLUCOSE UR STRIP.AUTO-MCNC: NEGATIVE MG/DL
HBV SURFACE AG SER QL: NEGATIVE
HCT VFR BLD AUTO: 40.7 % (ref 37–47)
HGB BLD-MCNC: 13.5 G/DL (ref 12–16)
HIV 1+2 AB+HIV1 P24 AG SERPL QL IA: NON REACTIVE
HYALINE CASTS #/AREA URNS LPF: ABNORMAL /LPF
IMM GRANULOCYTES # BLD AUTO: 0.05 K/UL (ref 0–0.11)
IMM GRANULOCYTES NFR BLD AUTO: 0.6 % (ref 0–0.9)
KETONES UR STRIP.AUTO-MCNC: NEGATIVE MG/DL
LEUKOCYTE ESTERASE UR QL STRIP.AUTO: ABNORMAL
LYMPHOCYTES # BLD AUTO: 1.78 K/UL (ref 1–4.8)
LYMPHOCYTES NFR BLD: 22.4 % (ref 22–41)
MCH RBC QN AUTO: 29.9 PG (ref 27–33)
MCHC RBC AUTO-ENTMCNC: 33.2 G/DL (ref 33.6–35)
MCV RBC AUTO: 90 FL (ref 81.4–97.8)
MICRO URNS: ABNORMAL
MONOCYTES # BLD AUTO: 0.52 K/UL (ref 0–0.85)
MONOCYTES NFR BLD AUTO: 6.5 % (ref 0–13.4)
NEUTROPHILS # BLD AUTO: 5.49 K/UL (ref 2–7.15)
NEUTROPHILS NFR BLD: 69.2 % (ref 44–72)
NITRITE UR QL STRIP.AUTO: NEGATIVE
NRBC # BLD AUTO: 0 K/UL
NRBC BLD AUTO-RTO: 0 /100 WBC
PH UR STRIP.AUTO: 6.5 [PH]
PLATELET # BLD AUTO: 241 K/UL (ref 164–446)
PMV BLD AUTO: 10.2 FL (ref 9–12.9)
PROT UR QL STRIP: NEGATIVE MG/DL
RBC # BLD AUTO: 4.52 M/UL (ref 4.2–5.4)
RBC # URNS HPF: ABNORMAL /HPF
RBC UR QL AUTO: NEGATIVE
RENAL EPI CELLS #/AREA URNS HPF: ABNORMAL /HPF
RH BLD: NORMAL
RUBV AB SER QL: 16.2 IU/ML
SP GR UR STRIP.AUTO: 1.02
TREPONEMA PALLIDUM IGG+IGM AB [PRESENCE] IN SERUM OR PLASMA BY IMMUNOASSAY: NON REACTIVE
UROBILINOGEN UR STRIP.AUTO-MCNC: 1 MG/DL
WBC # BLD AUTO: 7.9 K/UL (ref 4.8–10.8)
WBC #/AREA URNS HPF: ABNORMAL /HPF

## 2017-09-16 PROCEDURE — 87086 URINE CULTURE/COLONY COUNT: CPT

## 2017-09-16 PROCEDURE — 81001 URINALYSIS AUTO W/SCOPE: CPT

## 2017-09-16 PROCEDURE — 81511 FTL CGEN ABNOR FOUR ANAL: CPT

## 2017-09-16 PROCEDURE — 86850 RBC ANTIBODY SCREEN: CPT

## 2017-09-16 PROCEDURE — 86900 BLOOD TYPING SEROLOGIC ABO: CPT

## 2017-09-16 PROCEDURE — 86780 TREPONEMA PALLIDUM: CPT

## 2017-09-16 PROCEDURE — 85025 COMPLETE CBC W/AUTO DIFF WBC: CPT

## 2017-09-16 PROCEDURE — 36415 COLL VENOUS BLD VENIPUNCTURE: CPT

## 2017-09-16 PROCEDURE — 87389 HIV-1 AG W/HIV-1&-2 AB AG IA: CPT

## 2017-09-16 PROCEDURE — 87340 HEPATITIS B SURFACE AG IA: CPT

## 2017-09-16 PROCEDURE — 86762 RUBELLA ANTIBODY: CPT

## 2017-09-16 PROCEDURE — 86901 BLOOD TYPING SEROLOGIC RH(D): CPT

## 2017-09-18 LAB
BACTERIA UR CULT: NORMAL
SIGNIFICANT IND 70042: NORMAL
SOURCE SOURCE: NORMAL

## 2017-09-19 LAB
# FETUSES US: NORMAL
AFP MOM SERPL: NORMAL
AFP SERPL-MCNC: 168 NG/ML
AGE - REPORTED: 25 YR
GA METHOD: NORMAL
GA: 27.14 WEEKS
HCG MOM SERPL: NORMAL
HCG SERPL-ACNC: NORMAL IU/L
IDDM PATIENT QL: NO
INHIBIN A MOM SERPL: NORMAL
INHIBIN A SERPL-MCNC: 218 PG/ML
INTEGRATED SCN PATIENT-IMP: NORMAL
PATHOLOGY STUDY: NORMAL
U ESTRIOL MOM SERPL: NORMAL
U ESTRIOL SERPL-MCNC: 3.79 NG/ML

## 2017-09-20 ENCOUNTER — ROUTINE PRENATAL (OUTPATIENT)
Dept: OBGYN | Facility: CLINIC | Age: 27
End: 2017-09-20
Payer: COMMERCIAL

## 2017-09-20 VITALS — DIASTOLIC BLOOD PRESSURE: 70 MMHG | WEIGHT: 151 LBS | BODY MASS INDEX: 25.13 KG/M2 | SYSTOLIC BLOOD PRESSURE: 112 MMHG

## 2017-09-20 DIAGNOSIS — Z34.92 NORMAL PREGNANCY IN SECOND TRIMESTER: ICD-10-CM

## 2017-09-20 PROCEDURE — 90040 PR PRENATAL FOLLOW UP: CPT | Performed by: OBSTETRICS & GYNECOLOGY

## 2017-09-20 NOTE — LETTER
"Count Your Baby's Movements  Another step to a healthy delivery        How Many Weeks Pregnant? 27 wks 5 days`   Date to Begin Countin17              How to use this chart    One way for your physician to keep track of your baby's health is by knowing how often the baby moves (or \"kicks\") in your womb.  You can help your physician to do this by using this chart every day.    Every day, you should see how many hours it takes for your baby to move 10 times.  Start in the morning, as soon as you get up.    · First, write down the time your baby moves until you get to 10.  · Check off one box every time your baby moves until you get to 10.  · Write down the time you finished counting in the last column.  · Total how long it took to count up all 10 movements.  · Finally, fill in the box that shows how long this took.  After counting 10 movements, you no longer have to count any more that day.  The next morning, just start counting again as soon as you get up.    What should you call a \"movement\"?  It is hard to say, because it will feel different from one mother to another and from one pregnancy to the next.  The important thing is that you count the movements the same way throughout your pregnancy.  If you have more questions, you should ask your physician.    Count carefully every day!  SAMPLE:  Week 28    How many hours did it take to feel 10 movements?       Start  Time     1     2     3     4     5     6     7     8     9     10   Finish Time   Mon 8:20 ·  ·  ·  ·  ·  ·  ·  ·  ·  ·  11:40                  Sat               Sun                 IMPORTANT: You should contact your physician if it takes more than two hours for you to feel 10 movements.  Each morning, write down the time and start to count the movements of your baby.  Keep track by checking off one box every time you feel one movement.  When you have felt 10 \"kicks\", write down the time you " finished counting in the last column.  Then fill in the   box (over the check sabrina) for the number of hours it took.  Be sure to read the complete instructions on the previous page.

## 2017-09-30 ENCOUNTER — HOSPITAL ENCOUNTER (OUTPATIENT)
Dept: LAB | Facility: MEDICAL CENTER | Age: 27
End: 2017-09-30
Attending: OBSTETRICS & GYNECOLOGY
Payer: COMMERCIAL

## 2017-09-30 DIAGNOSIS — Z34.92 NORMAL PREGNANCY IN SECOND TRIMESTER: ICD-10-CM

## 2017-09-30 LAB — GLUCOSE 1H P 50 G GLC PO SERPL-MCNC: 90 MG/DL (ref 70–139)

## 2017-09-30 PROCEDURE — 82950 GLUCOSE TEST: CPT

## 2017-09-30 PROCEDURE — 36415 COLL VENOUS BLD VENIPUNCTURE: CPT

## 2017-10-04 ENCOUNTER — ROUTINE PRENATAL (OUTPATIENT)
Dept: OBGYN | Facility: CLINIC | Age: 27
End: 2017-10-04
Payer: COMMERCIAL

## 2017-10-04 VITALS — WEIGHT: 154 LBS | BODY MASS INDEX: 25.63 KG/M2

## 2017-10-04 DIAGNOSIS — Z34.92 NORMAL PREGNANCY IN SECOND TRIMESTER: ICD-10-CM

## 2017-10-04 PROCEDURE — 90040 PR PRENATAL FOLLOW UP: CPT | Performed by: OBSTETRICS & GYNECOLOGY

## 2017-10-04 NOTE — PROGRESS NOTES
Carol Buitrago is a 26 y.o.  at 29w5d here today for obstetrical visit.  Patient is without complaints.    She reports good fetal movement.  She denies vaginal bleeding.  She denies rupture of membranes.  She denies contractions.     has Normal pregnancy in second trimester on her problem list.        A/P IUP at 29w5d  AFP done  1 hour glucola done  Rhogam done  GBS     F/U in 2 weeks

## 2017-10-18 ENCOUNTER — ROUTINE PRENATAL (OUTPATIENT)
Dept: OBGYN | Facility: CLINIC | Age: 27
End: 2017-10-18
Payer: COMMERCIAL

## 2017-10-18 VITALS — SYSTOLIC BLOOD PRESSURE: 110 MMHG | DIASTOLIC BLOOD PRESSURE: 66 MMHG | BODY MASS INDEX: 25.96 KG/M2 | WEIGHT: 156 LBS

## 2017-10-18 DIAGNOSIS — Z34.92 NORMAL PREGNANCY IN SECOND TRIMESTER: ICD-10-CM

## 2017-10-18 PROCEDURE — 90040 PR PRENATAL FOLLOW UP: CPT | Performed by: OBSTETRICS & GYNECOLOGY

## 2017-10-18 NOTE — PROGRESS NOTES
Carol Buitrago is a 26 y.o.  at 31w5d here today for obstetrical visit.  Patient is without complaints.    She reports good fetal movement.  She denies vaginal bleeding.  She denies rupture of membranes.  She denies contractions.     has Normal pregnancy in second trimester on her problem list.    D/c childbirth classes  Has pediatrician    A/P IUP at 31w5d  AFP done  1 hour glucola done  Rhogam o pos  GBS     F/U in 2 weeks

## 2017-11-02 ENCOUNTER — ROUTINE PRENATAL (OUTPATIENT)
Dept: OBGYN | Facility: CLINIC | Age: 27
End: 2017-11-02
Payer: COMMERCIAL

## 2017-11-02 VITALS — WEIGHT: 161 LBS | DIASTOLIC BLOOD PRESSURE: 64 MMHG | BODY MASS INDEX: 26.79 KG/M2 | SYSTOLIC BLOOD PRESSURE: 90 MMHG

## 2017-11-02 DIAGNOSIS — Z34.92 NORMAL PREGNANCY IN SECOND TRIMESTER: ICD-10-CM

## 2017-11-02 PROCEDURE — 90040 PR PRENATAL FOLLOW UP: CPT | Performed by: OBSTETRICS & GYNECOLOGY

## 2017-11-02 NOTE — PROGRESS NOTES
Carol Buitrago is a 26 y.o.  at 33w6d here today for obstetrical visit.  Patient is without complaints.    She reports good fetal movement.  She denies vaginal bleeding.  She denies rupture of membranes.  She denies contractions.     has Normal pregnancy in second trimester on her problem list.    Patient has some external genital warts noted on the left inguinal area    A/P IUP at 33w6d  AFP done  1 hour glucola done  Rhogam o pos  GBS next    F/U in 2 weeks

## 2017-11-08 ENCOUNTER — ROUTINE PRENATAL (OUTPATIENT)
Dept: OBGYN | Facility: CLINIC | Age: 27
End: 2017-11-08
Payer: COMMERCIAL

## 2017-11-08 VITALS — WEIGHT: 162 LBS | DIASTOLIC BLOOD PRESSURE: 64 MMHG | BODY MASS INDEX: 26.96 KG/M2 | SYSTOLIC BLOOD PRESSURE: 100 MMHG

## 2017-11-08 DIAGNOSIS — Z34.92 NORMAL PREGNANCY IN SECOND TRIMESTER: ICD-10-CM

## 2017-11-08 PROCEDURE — 90040 PR PRENATAL FOLLOW UP: CPT | Performed by: OBSTETRICS & GYNECOLOGY

## 2017-11-08 ASSESSMENT — PATIENT HEALTH QUESTIONNAIRE - PHQ9: CLINICAL INTERPRETATION OF PHQ2 SCORE: 0

## 2017-11-08 NOTE — PROGRESS NOTES
OB f/u. + fetal movement.  No VB, LOF or UC's.  Flu vaccine offered but declined  Preferred pharmacy confirmed.

## 2017-11-08 NOTE — PROGRESS NOTES
Carol Buitrago is a 26 y.o.  at 34w5d here today for obstetrical visit.  Patient is without complaints.    She reports good fetal movement.  She denies vaginal bleeding.  She denies rupture of membranes.  She denies contractions.     has Normal pregnancy in second trimester on her problem list.        A/P IUP at 34w5d  AFP done  1 hour glucola done  Rhogam o pos  GBS next    F/U in 1 weeks

## 2017-11-17 ENCOUNTER — ROUTINE PRENATAL (OUTPATIENT)
Dept: OBGYN | Facility: CLINIC | Age: 27
End: 2017-11-17
Payer: COMMERCIAL

## 2017-11-17 ENCOUNTER — HOSPITAL ENCOUNTER (OUTPATIENT)
Facility: MEDICAL CENTER | Age: 27
End: 2017-11-17
Attending: OBSTETRICS & GYNECOLOGY
Payer: COMMERCIAL

## 2017-11-17 VITALS — DIASTOLIC BLOOD PRESSURE: 70 MMHG | WEIGHT: 161 LBS | BODY MASS INDEX: 26.79 KG/M2 | SYSTOLIC BLOOD PRESSURE: 110 MMHG

## 2017-11-17 DIAGNOSIS — Z34.92 NORMAL PREGNANCY IN SECOND TRIMESTER: ICD-10-CM

## 2017-11-17 PROCEDURE — 87653 STREP B DNA AMP PROBE: CPT

## 2017-11-17 PROCEDURE — 90040 PR PRENATAL FOLLOW UP: CPT | Performed by: OBSTETRICS & GYNECOLOGY

## 2017-11-17 NOTE — PROGRESS NOTES
Carol Buitrago is a 26 y.o.  at 36w0d here today for obstetrical visit.  Patient is without complaints.    She reports good fetal movement.  She denies vaginal bleeding.  She denies rupture of membranes.  She denies contractions.     has Normal pregnancy in second trimester on her problem list.        A/P IUP at 36w0d  AFP done  1 hour glucola done  Rhogam opos  GBS today    F/U in 1 weeks

## 2017-11-19 LAB — GP B STREP DNA SPEC QL NAA+PROBE: NEGATIVE

## 2017-11-21 ENCOUNTER — ROUTINE PRENATAL (OUTPATIENT)
Dept: OBGYN | Facility: CLINIC | Age: 27
End: 2017-11-21
Payer: COMMERCIAL

## 2017-11-21 VITALS — BODY MASS INDEX: 26.79 KG/M2 | WEIGHT: 161 LBS | DIASTOLIC BLOOD PRESSURE: 80 MMHG | SYSTOLIC BLOOD PRESSURE: 108 MMHG

## 2017-11-21 DIAGNOSIS — Z34.92 NORMAL PREGNANCY IN SECOND TRIMESTER: ICD-10-CM

## 2017-11-21 PROCEDURE — 90040 PR PRENATAL FOLLOW UP: CPT | Performed by: OBSTETRICS & GYNECOLOGY

## 2017-11-21 NOTE — PROGRESS NOTES
Carol Buitrago is a 26 y.o.  at 36w4d here today for obstetrical visit.  Patient is without complaints.    She reports excellent fetal movement.  She denies vaginal bleeding.  She denies rupture of membranes.  She denies contractions.     has Normal pregnancy in second trimester on her problem list.        A/P IUP at 36w4d  AFP done  1 hour glucola done  Rhogam done  GBS done    F/U in 1 weeks

## 2017-11-29 ENCOUNTER — ROUTINE PRENATAL (OUTPATIENT)
Dept: OBGYN | Facility: CLINIC | Age: 27
End: 2017-11-29
Payer: COMMERCIAL

## 2017-11-29 VITALS — DIASTOLIC BLOOD PRESSURE: 76 MMHG | SYSTOLIC BLOOD PRESSURE: 110 MMHG

## 2017-11-29 DIAGNOSIS — Z34.92 NORMAL PREGNANCY IN SECOND TRIMESTER: ICD-10-CM

## 2017-11-29 PROCEDURE — 90040 PR PRENATAL FOLLOW UP: CPT | Performed by: OBSTETRICS & GYNECOLOGY

## 2017-11-29 NOTE — PROGRESS NOTES
Carol Buitrago is a 27 y.o.  at 37w5d here today for obstetrical visit.  Patient is without complaints.    She reports good fetal movement.  She denies vaginal bleeding.  She denies rupture of membranes.  She denies contractions.     has Normal pregnancy in second trimester on her problem list.    Desires iol on     A/P IUP at 37w5d  AFP done  1 hour glucola done  Rhogam done  GBS done    F/U in 1 weeks

## 2017-11-30 ENCOUNTER — HOSPITAL ENCOUNTER (OUTPATIENT)
Facility: MEDICAL CENTER | Age: 27
End: 2017-11-30
Attending: OBSTETRICS & GYNECOLOGY | Admitting: OBSTETRICS & GYNECOLOGY
Payer: COMMERCIAL

## 2017-11-30 ENCOUNTER — TELEPHONE (OUTPATIENT)
Dept: OBGYN | Facility: MEDICAL CENTER | Age: 27
End: 2017-11-30

## 2017-11-30 VITALS
RESPIRATION RATE: 16 BRPM | DIASTOLIC BLOOD PRESSURE: 76 MMHG | HEART RATE: 75 BPM | TEMPERATURE: 98.4 F | SYSTOLIC BLOOD PRESSURE: 116 MMHG

## 2017-11-30 PROCEDURE — 59025 FETAL NON-STRESS TEST: CPT | Performed by: OBSTETRICS & GYNECOLOGY

## 2017-11-30 NOTE — PROGRESS NOTES
1030-pt presents from home with c/o uc's for 2 hours, no c/o leaking, bleeding, or pain, states baby is moving normally, placed on external monitors, vs taken, SVE 1 cm, no change from yesterday  1050-TC Dr Babb, report given, discharge orders received  1105-pt discharged home with labor precautions, verbalized understanding, left ambulatory on her own

## 2017-11-30 NOTE — TELEPHONE ENCOUNTER
----- Message from Roshni Castillo sent at 11/30/2017  8:49 AM PST -----  Regarding: questions  Contact: [phone]  Patient called stated that she is having some pain in her belly and wants to know what she should do. Please call back thank you.

## 2017-11-30 NOTE — TELEPHONE ENCOUNTER
Called and spoke with pt and she states she is having contraction's that are constant and more intense with robyn. Pt states no VB or gushes of fluid. I told the pt she should go to labor and delivery and be evaluated to make sure it is not labor.Pt verbalizes understanding and has no further questions at this time.

## 2017-12-08 ENCOUNTER — ROUTINE PRENATAL (OUTPATIENT)
Dept: OBGYN | Facility: CLINIC | Age: 27
End: 2017-12-08
Payer: COMMERCIAL

## 2017-12-08 VITALS — DIASTOLIC BLOOD PRESSURE: 82 MMHG | SYSTOLIC BLOOD PRESSURE: 130 MMHG

## 2017-12-08 DIAGNOSIS — Z34.92 NORMAL PREGNANCY IN SECOND TRIMESTER: ICD-10-CM

## 2017-12-08 PROCEDURE — 90040 PR PRENATAL FOLLOW UP: CPT | Performed by: OBSTETRICS & GYNECOLOGY

## 2017-12-08 NOTE — PROGRESS NOTES
Pt here for OB F/V. Good fetal movement. Denies LOF, VB.  irreg contractions  Cervix check today  IOL 12/18/17

## 2017-12-08 NOTE — PROGRESS NOTES
Carol Buitrago is a 27 y.o.  at 39w0d here today for obstetrical visit.  Patient is without complaints.    She reports good fetal movement.  She denies vaginal bleeding.  She denies rupture of membranes.  She denies contractions.     has Normal pregnancy in second trimester on her problem list.    IOL 19    A/P IUP at 39w0d  AFP done  1 hour glucola done  Rhogam done  GBS negative    F/U in 1 weeks

## 2017-12-13 ENCOUNTER — HOSPITAL ENCOUNTER (OUTPATIENT)
Facility: MEDICAL CENTER | Age: 27
End: 2017-12-13
Attending: OBSTETRICS & GYNECOLOGY | Admitting: OBSTETRICS & GYNECOLOGY
Payer: COMMERCIAL

## 2017-12-13 ENCOUNTER — TELEPHONE (OUTPATIENT)
Dept: OBGYN | Facility: CLINIC | Age: 27
End: 2017-12-13

## 2017-12-13 LAB
A1 MICROGLOB PLACENTAL VAG QL: NEGATIVE
CRYSTALS AMN MICRO: NORMAL

## 2017-12-13 PROCEDURE — 89060 EXAM SYNOVIAL FLUID CRYSTALS: CPT

## 2017-12-13 PROCEDURE — 84112 EVAL AMNIOTIC FLUID PROTEIN: CPT

## 2017-12-13 PROCEDURE — 59025 FETAL NON-STRESS TEST: CPT | Performed by: OBSTETRICS & GYNECOLOGY

## 2017-12-13 NOTE — TELEPHONE ENCOUNTER
Pt called earlier with complaints of losing mucus plug. Called patient back but no answer or voicemail.

## 2017-12-13 NOTE — TELEPHONE ENCOUNTER
Pt called back and she states she has been leaking since last night. Denies any bleeding. Good fetal movement. Recommended for patient to be seen at L & D to be evaluated. Pt has IOL scheduled on 12/18/17

## 2017-12-14 ENCOUNTER — TELEPHONE (OUTPATIENT)
Dept: OBGYN | Facility: MEDICAL CENTER | Age: 27
End: 2017-12-14

## 2017-12-14 NOTE — TELEPHONE ENCOUNTER
----- Message from Roshni Castillo sent at 12/13/2017 10:58 AM PST -----  Regarding: questions  Contact: [phone]  Patient called states that she thinks last night she lost her mucos plug. States no contractions but would like to know what she should do. She said she will be 40 weeks on Friday.please call back thank you.

## 2017-12-14 NOTE — PROGRESS NOTES
JOMAR 12/15, EGA 39.5    Denies VB, UC's and +FM    PT presenting with CO leaking starting last night, describes as small amount of clear fluid.     Sterile specillum and FERN and amnisure completed. No gross pooling noted.    SVE per doc flow.     Fern results negative, per Todd DC with reactive tracing.     PT notified she will be DC'd, she stated she is still feeling wet, per todd send amnisure     amnisure recollected.     amnisure sent.

## 2017-12-14 NOTE — PROGRESS NOTES
1910 - report from TURNER Peña RN. FOB at bedside. POC discussed, questions encouraged and answered, understanding verbalized.    1940 - negative amnisure result received. Result reported to Dr. Brooks, discharge order received.     1941 - discharge instructions given as follows:  If you think you are in labor, time contractions (laying on your left side) from the beginning of one contraction to the beginning of the next contraction for at least one hour.   Increase fluid intake:10-12 8oz glasses non-caffeinated fluid per day.  Report any pressure or burning on urination to your physician.   Monitor fetal movement: You should be able to count 10 sets of movements in 2 hrs. If you notice an absence or marked decrease in fetal movement, call your physician or go to the hospital.   Report any sudden, sharp abdominal pain.   Report any bleeding, spotting or pinkish discharge is normal after vaginal exam and or sexual intercourse.   Call MD or return to unit if:  You have regular contractions that get progressively closer, longer and stronger.   Your water breaks (remember time and color).   You have bleeding like a period.  Decreased or absent fetal movement.   Questions answered, understanding verbalized.     1950 - discharged home with FOB in stable walking condition.

## 2017-12-15 ENCOUNTER — ROUTINE PRENATAL (OUTPATIENT)
Dept: OBGYN | Facility: CLINIC | Age: 27
End: 2017-12-15
Payer: COMMERCIAL

## 2017-12-15 VITALS — BODY MASS INDEX: 27.12 KG/M2 | SYSTOLIC BLOOD PRESSURE: 122 MMHG | DIASTOLIC BLOOD PRESSURE: 82 MMHG | WEIGHT: 163 LBS

## 2017-12-15 DIAGNOSIS — Z34.92 NORMAL PREGNANCY IN SECOND TRIMESTER: ICD-10-CM

## 2017-12-15 PROCEDURE — 90040 PR PRENATAL FOLLOW UP: CPT | Performed by: OBSTETRICS & GYNECOLOGY

## 2017-12-15 NOTE — PROGRESS NOTES
Carol Buitrago is a 27 y.o.  at 40w0d here today for obstetrical visit.  Patient is without complaints.    She reports good fetal movement.  She denies vaginal bleeding.  She denies rupture of membranes.  She denies contractions.     has Normal pregnancy in second trimester on her problem list.    Patient has induction of labor scheduled for     A/P IUP at 40w0d  AFP done  1 hour glucola done  Rhogam done  GBS     F/U in 1 weeks

## 2017-12-16 ENCOUNTER — HOSPITAL ENCOUNTER (INPATIENT)
Facility: MEDICAL CENTER | Age: 27
LOS: 2 days | End: 2017-12-18
Attending: OBSTETRICS & GYNECOLOGY | Admitting: OBSTETRICS & GYNECOLOGY
Payer: COMMERCIAL

## 2017-12-16 LAB
BASOPHILS # BLD AUTO: 0.2 % (ref 0–1.8)
BASOPHILS # BLD: 0.02 K/UL (ref 0–0.12)
EOSINOPHIL # BLD AUTO: 0.01 K/UL (ref 0–0.51)
EOSINOPHIL NFR BLD: 0.1 % (ref 0–6.9)
ERYTHROCYTE [DISTWIDTH] IN BLOOD BY AUTOMATED COUNT: 41 FL (ref 35.9–50)
HCT VFR BLD AUTO: 40 % (ref 37–47)
HGB BLD-MCNC: 13.8 G/DL (ref 12–16)
HOLDING TUBE BB 8507: NORMAL
IMM GRANULOCYTES # BLD AUTO: 0.04 K/UL (ref 0–0.11)
IMM GRANULOCYTES NFR BLD AUTO: 0.4 % (ref 0–0.9)
LYMPHOCYTES # BLD AUTO: 1.37 K/UL (ref 1–4.8)
LYMPHOCYTES NFR BLD: 12.8 % (ref 22–41)
MCH RBC QN AUTO: 29.6 PG (ref 27–33)
MCHC RBC AUTO-ENTMCNC: 34.5 G/DL (ref 33.6–35)
MCV RBC AUTO: 85.7 FL (ref 81.4–97.8)
MONOCYTES # BLD AUTO: 0.56 K/UL (ref 0–0.85)
MONOCYTES NFR BLD AUTO: 5.2 % (ref 0–13.4)
NEUTROPHILS # BLD AUTO: 8.7 K/UL (ref 2–7.15)
NEUTROPHILS NFR BLD: 81.3 % (ref 44–72)
NRBC # BLD AUTO: 0 K/UL
NRBC BLD AUTO-RTO: 0 /100 WBC
PLATELET # BLD AUTO: 204 K/UL (ref 164–446)
PMV BLD AUTO: 10.9 FL (ref 9–12.9)
RBC # BLD AUTO: 4.67 M/UL (ref 4.2–5.4)
WBC # BLD AUTO: 10.7 K/UL (ref 4.8–10.8)

## 2017-12-16 PROCEDURE — 85025 COMPLETE CBC W/AUTO DIFF WBC: CPT

## 2017-12-16 PROCEDURE — 700105 HCHG RX REV CODE 258: Performed by: OBSTETRICS & GYNECOLOGY

## 2017-12-16 PROCEDURE — 700111 HCHG RX REV CODE 636 W/ 250 OVERRIDE (IP)

## 2017-12-16 PROCEDURE — 700111 HCHG RX REV CODE 636 W/ 250 OVERRIDE (IP): Performed by: OBSTETRICS & GYNECOLOGY

## 2017-12-16 PROCEDURE — 10907ZC DRAINAGE OF AMNIOTIC FLUID, THERAPEUTIC FROM PRODUCTS OF CONCEPTION, VIA NATURAL OR ARTIFICIAL OPENING: ICD-10-PCS | Performed by: OBSTETRICS & GYNECOLOGY

## 2017-12-16 PROCEDURE — 770002 HCHG ROOM/CARE - OB PRIVATE (112)

## 2017-12-16 PROCEDURE — 700105 HCHG RX REV CODE 258

## 2017-12-16 RX ORDER — MISOPROSTOL 200 UG/1
800 TABLET ORAL
Status: DISCONTINUED | OUTPATIENT
Start: 2017-12-16 | End: 2017-12-17 | Stop reason: HOSPADM

## 2017-12-16 RX ORDER — ROPIVACAINE HYDROCHLORIDE 2 MG/ML
INJECTION, SOLUTION EPIDURAL; INFILTRATION; PERINEURAL
Status: COMPLETED
Start: 2017-12-16 | End: 2017-12-16

## 2017-12-16 RX ORDER — SODIUM CHLORIDE, SODIUM LACTATE, POTASSIUM CHLORIDE, CALCIUM CHLORIDE 600; 310; 30; 20 MG/100ML; MG/100ML; MG/100ML; MG/100ML
INJECTION, SOLUTION INTRAVENOUS
Status: COMPLETED
Start: 2017-12-16 | End: 2017-12-16

## 2017-12-16 RX ORDER — SODIUM CHLORIDE, SODIUM LACTATE, POTASSIUM CHLORIDE, CALCIUM CHLORIDE 600; 310; 30; 20 MG/100ML; MG/100ML; MG/100ML; MG/100ML
INJECTION, SOLUTION INTRAVENOUS CONTINUOUS
Status: DISPENSED | OUTPATIENT
Start: 2017-12-16 | End: 2017-12-16

## 2017-12-16 RX ORDER — ALUMINA, MAGNESIA, AND SIMETHICONE 2400; 2400; 240 MG/30ML; MG/30ML; MG/30ML
30 SUSPENSION ORAL EVERY 6 HOURS PRN
Status: DISCONTINUED | OUTPATIENT
Start: 2017-12-16 | End: 2017-12-17 | Stop reason: HOSPADM

## 2017-12-16 RX ADMIN — SODIUM CHLORIDE, POTASSIUM CHLORIDE, SODIUM LACTATE AND CALCIUM CHLORIDE: 600; 310; 30; 20 INJECTION, SOLUTION INTRAVENOUS at 16:30

## 2017-12-16 RX ADMIN — SODIUM CHLORIDE, POTASSIUM CHLORIDE, SODIUM LACTATE AND CALCIUM CHLORIDE: 600; 310; 30; 20 INJECTION, SOLUTION INTRAVENOUS at 15:50

## 2017-12-16 RX ADMIN — Medication 2 MILLI-UNITS/MIN: at 19:47

## 2017-12-16 RX ADMIN — SODIUM CHLORIDE, POTASSIUM CHLORIDE, SODIUM LACTATE AND CALCIUM CHLORIDE: 600; 310; 30; 20 INJECTION, SOLUTION INTRAVENOUS at 15:00

## 2017-12-16 RX ADMIN — SODIUM CHLORIDE, POTASSIUM CHLORIDE, SODIUM LACTATE AND CALCIUM CHLORIDE: 600; 310; 30; 20 INJECTION, SOLUTION INTRAVENOUS at 19:43

## 2017-12-16 RX ADMIN — ROPIVACAINE HYDROCHLORIDE 100 ML: 2 INJECTION, SOLUTION EPIDURAL; INFILTRATION at 16:44

## 2017-12-16 ASSESSMENT — LIFESTYLE VARIABLES
ALCOHOL_USE: NO
DO YOU DRINK ALCOHOL: NO
DO YOU DRINK ALCOHOL: NO
EVER_SMOKED: NEVER

## 2017-12-16 ASSESSMENT — COPD QUESTIONNAIRES
DO YOU EVER COUGH UP ANY MUCUS OR PHLEGM?: NO/ONLY WITH OCCASIONAL COLDS OR INFECTIONS
DURING THE PAST 4 WEEKS HOW MUCH DID YOU FEEL SHORT OF BREATH: NONE/LITTLE OF THE TIME
HAVE YOU SMOKED AT LEAST 100 CIGARETTES IN YOUR ENTIRE LIFE: NO/DON'T KNOW
COPD SCREENING SCORE: 0

## 2017-12-16 ASSESSMENT — PATIENT HEALTH QUESTIONNAIRE - PHQ9
SUM OF ALL RESPONSES TO PHQ QUESTIONS 1-9: 0
1. LITTLE INTEREST OR PLEASURE IN DOING THINGS: NOT AT ALL
SUM OF ALL RESPONSES TO PHQ9 QUESTIONS 1 AND 2: 0
2. FEELING DOWN, DEPRESSED, IRRITABLE, OR HOPELESS: NOT AT ALL

## 2017-12-16 NOTE — H&P
History and Physical      Carol Buitrago is a 27 y.o. female  at 40w1d who presents for contractions    Subjective:   positive  For CTXS.   positive Feels pain   negative for LOF  negative for vaginal bleeding.   positive for fetal movement    ROS: A comprehensive review of systems was negative.    No past medical history on file.  No past surgical history on file.  OB History    Para Term  AB Living   3 0 0 0 2 0   SAB TAB Ectopic Molar Multiple Live Births   1 1 0   0        # Outcome Date GA Lbr Saad/2nd Weight Sex Delivery Anes PTL Lv   3 Current            2 SAB            1 TAB                 Social History     Social History   • Marital status:      Spouse name: N/A   • Number of children: N/A   • Years of education: N/A     Occupational History   • Not on file.     Social History Main Topics   • Smoking status: Never Smoker   • Smokeless tobacco: Never Used   • Alcohol use Yes   • Drug use: No   • Sexual activity: Yes     Partners: Male     Other Topics Concern   • Not on file     Social History Narrative   • No narrative on file     Allergies: Patient has no known allergies.    Current Facility-Administered Medications:   •  LR infusion, , Intravenous, Continuous, Jerrica Ritter M.D.  •  fentaNYL (SUBLIMAZE) injection 50 mcg, 50 mcg, Intravenous, Q HOUR PRN, Jerrica Ritter M.D.  •  fentaNYL (SUBLIMAZE) injection 100 mcg, 100 mcg, Intravenous, Q HOUR PRN, Jerrica Ritter M.D.  •  mag hydrox-al hydrox-simeth (MAALOX PLUS ES or MYLANTA DS) suspension 30 mL, 30 mL, Oral, Q6HRS PRN, Jerrica Ritter M.D.  •  oxytocin (PITOCIN) infusion (for induction), 0.5-20 mary-units/min, Intravenous, Continuous, Jerrica Ritter M.D.  •  misoprostol (CYTOTEC) tablet 800 mcg, 800 mcg, Rectal, Once PRN, Jerrica Ritter M.D.    Prenatal care with [facility] starting at 22 weeks with following problems:  Patient Active  Problem List    Diagnosis Date Noted   • Indication for care in labor or delivery 12/16/2017   • Normal pregnancy in second trimester 09/20/2017     Objective:      Last menstrual period 03/01/2017.    General:   no acute distress, alert, cooperative   Skin:   normal   HEENT:  Sclera clear, anicteric   Lungs:   CTA bilateral   Heart:   S1, S2 normal, no murmur, click, rub or gallop, regular rate and rhythm   Abdomen:   gravid, NT   EFW:  3400   Pelvis:  adequate with gynecoid pelvis   FHT:  150 BPM   Uterine Size: S=D   Presentations: Cephalic   Cervix:     Dilation: 4    Effacement: 100%    Station:  -2    Consistency: Soft    Position: Anterior     Lab Review  Lab:   Blood type: O     Recent Results (from the past 5880 hour(s))   CHLAMYDIA/GC PCR URINE OR SWAB    Collection Time: 06/23/17  2:43 PM   Result Value Ref Range    Source Vaginal     C. trachomatis by PCR Negative Negative    N. gonorrhoeae by PCR Negative Negative   POCT US - In Clinic OB Scan    Collection Time: 06/23/17  2:59 PM   Result Value Ref Range    In Clinic OB Scan     PRENATAL PANEL 3+HIV+UACXI    Collection Time: 09/16/17  8:51 AM   Result Value Ref Range    Color Yellow     Character Cloudy (A)     Specific Gravity 1.023 <1.035    Ph 6.5 5.0 - 8.0    Glucose Negative Negative mg/dL    Ketones Negative Negative mg/dL    Protein Negative Negative mg/dL    Bilirubin Negative Negative    Urobilinogen, Urine 1.0 Negative    Nitrite Negative Negative    Leukocyte Esterase Small (A) Negative    Occult Blood Negative Negative    Micro Urine Req Microscopic     Culture Indicated Yes UA Culture    Rubella IgG Antibody 16.20 IU/mL    Syphilis, Treponemal Qual Non Reactive Non Reactive    Hepatitis B Surface Antigen Negative Negative    WBC 7.9 4.8 - 10.8 K/uL    RBC 4.52 4.20 - 5.40 M/uL    Hemoglobin 13.5 12.0 - 16.0 g/dL    Hematocrit 40.7 37.0 - 47.0 %    MCV 90.0 81.4 - 97.8 fL    MCH 29.9 27.0 - 33.0 pg    MCHC 33.2 (L) 33.6 - 35.0 g/dL    RDW  43.4 35.9 - 50.0 fL    Platelet Count 241 164 - 446 K/uL    MPV 10.2 9.0 - 12.9 fL    Neutrophils-Polys 69.20 44.00 - 72.00 %    Lymphocytes 22.40 22.00 - 41.00 %    Monocytes 6.50 0.00 - 13.40 %    Eosinophils 1.00 0.00 - 6.90 %    Basophils 0.30 0.00 - 1.80 %    Immature Granulocytes 0.60 0.00 - 0.90 %    Nucleated RBC 0.00 /100 WBC    Neutrophils (Absolute) 5.49 2.00 - 7.15 K/uL    Lymphs (Absolute) 1.78 1.00 - 4.80 K/uL    Monos (Absolute) 0.52 0.00 - 0.85 K/uL    Eos (Absolute) 0.08 0.00 - 0.51 K/uL    Baso (Absolute) 0.02 0.00 - 0.12 K/uL    Immature Granulocytes (abs) 0.05 0.00 - 0.11 K/uL    NRBC (Absolute) 0.00 K/uL   AFP TETRA    Collection Time: 09/16/17  8:51 AM   Result Value Ref Range    AFP Value -Eia 168 ng/mL    AFP MOM Value Not Done     Hcg Value 10,920 IU/L    Hcg Mom Not Done     Ue3 Value 3.79 ng/mL    Ue3 Mom Not Done     Interpretation See Note     M[age] JOMAR 27.0 yr    Gestational Age Based On US     Gestational Age 27.14 weeks    Insulin Dependent Diabetes No     Race White     Multiple Pregnancy One     Marisel Value -Eia 218 pg/mL    Marisel Mom Value Not Done     Maternal Weight 140 lbs    Err Maternal Scrn AFP See Note    HIV ANTIBODIES    Collection Time: 09/16/17  8:51 AM   Result Value Ref Range    HIV Ag/Ab Combo Assay Non Reactive Non Reactive   OP PRENATAL PANEL-BLOOD BANK    Collection Time: 09/16/17  8:51 AM   Result Value Ref Range    ABO Grouping Only O     Rh Grouping Only POS     Antibody Screen Scrn NEG    URINE CULTURE(NEW)    Collection Time: 09/16/17  8:51 AM   Result Value Ref Range    Significant Indicator NEG     Source UR     Urine Culture Mixed skin rafa >100,000 cfu/mL    URINE MICROSCOPIC (W/UA)    Collection Time: 09/16/17  8:51 AM   Result Value Ref Range    WBC 5-10 (A) /hpf    RBC 0-2 /hpf    Bacteria Moderate (A) None /hpf    Epithelial Cells Moderate (A) /hpf    Epithelial Cells Renal Few /hpf    Hyaline Cast 6-10 (A) /lpf   GLUCOSE 1HR GESTATIONAL     Collection Time: 17  9:31 AM   Result Value Ref Range    Glucose, Post Dose 90 70 - 139 mg/dL   GRP B STREP, BY PCR (GATICA BROTH)    Collection Time: 17  4:26 PM   Result Value Ref Range    Strep Gp B DNA PCR Negative Negative   FERN TEST    Collection Time: 17  5:50 PM   Result Value Ref Range    Fern Test On Amniotic Fluid see below Not present   AMNISURE ROM ASSAY    Collection Time: 17  7:08 PM   Result Value Ref Range    AmniSure ROM Negative Negative        Assessment:   Carol Buitrago at 40w1d  Labor status: Active phase labor.  Obstetrical history significant for   Patient Active Problem List    Diagnosis Date Noted   • Indication for care in labor or delivery 2017   • Normal pregnancy in second trimester 2017   .      Plan:     Admit to L&D  Pitocin for augmentation PRN  Epidural for pain mgt if desired  Anticipate

## 2017-12-16 NOTE — PROGRESS NOTES
JOMAR 12/15, EGA 40.1    Denies LOF, VB and +FM    Presenting with CO UC's that are painful and 3 minutes apart.    SVE /-2    1420 SVE /BBOW. Per MD will admit

## 2017-12-17 LAB
ERYTHROCYTE [DISTWIDTH] IN BLOOD BY AUTOMATED COUNT: 41.9 FL (ref 35.9–50)
HCT VFR BLD AUTO: 35 % (ref 37–47)
HGB BLD-MCNC: 11.9 G/DL (ref 12–16)
MCH RBC QN AUTO: 29.3 PG (ref 27–33)
MCHC RBC AUTO-ENTMCNC: 34 G/DL (ref 33.6–35)
MCV RBC AUTO: 86.2 FL (ref 81.4–97.8)
PLATELET # BLD AUTO: 167 K/UL (ref 164–446)
PMV BLD AUTO: 10.7 FL (ref 9–12.9)
RBC # BLD AUTO: 4.06 M/UL (ref 4.2–5.4)
WBC # BLD AUTO: 20.4 K/UL (ref 4.8–10.8)

## 2017-12-17 PROCEDURE — 700105 HCHG RX REV CODE 258: Performed by: OBSTETRICS & GYNECOLOGY

## 2017-12-17 PROCEDURE — 36415 COLL VENOUS BLD VENIPUNCTURE: CPT

## 2017-12-17 PROCEDURE — 85027 COMPLETE CBC AUTOMATED: CPT

## 2017-12-17 PROCEDURE — 304965 HCHG RECOVERY SERVICES

## 2017-12-17 PROCEDURE — A9270 NON-COVERED ITEM OR SERVICE: HCPCS | Performed by: OBSTETRICS & GYNECOLOGY

## 2017-12-17 PROCEDURE — 770002 HCHG ROOM/CARE - OB PRIVATE (112)

## 2017-12-17 PROCEDURE — 59409 OBSTETRICAL CARE: CPT

## 2017-12-17 PROCEDURE — 700111 HCHG RX REV CODE 636 W/ 250 OVERRIDE (IP): Performed by: OBSTETRICS & GYNECOLOGY

## 2017-12-17 PROCEDURE — 700111 HCHG RX REV CODE 636 W/ 250 OVERRIDE (IP)

## 2017-12-17 PROCEDURE — 700102 HCHG RX REV CODE 250 W/ 637 OVERRIDE(OP): Performed by: OBSTETRICS & GYNECOLOGY

## 2017-12-17 PROCEDURE — 303615 HCHG EPIDURAL/SPINAL ANESTHESIA FOR LABOR

## 2017-12-17 RX ORDER — ROPIVACAINE HYDROCHLORIDE 2 MG/ML
INJECTION, SOLUTION EPIDURAL; INFILTRATION; PERINEURAL
Status: COMPLETED
Start: 2017-12-17 | End: 2017-12-17

## 2017-12-17 RX ORDER — OXYCODONE HYDROCHLORIDE AND ACETAMINOPHEN 5; 325 MG/1; MG/1
1 TABLET ORAL EVERY 4 HOURS PRN
Status: DISCONTINUED | OUTPATIENT
Start: 2017-12-17 | End: 2017-12-18 | Stop reason: HOSPADM

## 2017-12-17 RX ORDER — DOCUSATE SODIUM 100 MG/1
100 CAPSULE, LIQUID FILLED ORAL 2 TIMES DAILY PRN
Status: DISCONTINUED | OUTPATIENT
Start: 2017-12-17 | End: 2017-12-18 | Stop reason: HOSPADM

## 2017-12-17 RX ORDER — SODIUM CHLORIDE, SODIUM LACTATE, POTASSIUM CHLORIDE, CALCIUM CHLORIDE 600; 310; 30; 20 MG/100ML; MG/100ML; MG/100ML; MG/100ML
INJECTION, SOLUTION INTRAVENOUS
Status: DISCONTINUED
Start: 2017-12-17 | End: 2017-12-17

## 2017-12-17 RX ORDER — DEXTROSE, SODIUM CHLORIDE, SODIUM LACTATE, POTASSIUM CHLORIDE, AND CALCIUM CHLORIDE 5; .6; .31; .03; .02 G/100ML; G/100ML; G/100ML; G/100ML; G/100ML
INJECTION, SOLUTION INTRAVENOUS CONTINUOUS
Status: DISCONTINUED | OUTPATIENT
Start: 2017-12-17 | End: 2017-12-17

## 2017-12-17 RX ORDER — OXYCODONE HYDROCHLORIDE AND ACETAMINOPHEN 5; 325 MG/1; MG/1
2 TABLET ORAL EVERY 4 HOURS PRN
Status: DISCONTINUED | OUTPATIENT
Start: 2017-12-17 | End: 2017-12-18 | Stop reason: HOSPADM

## 2017-12-17 RX ORDER — ONDANSETRON 2 MG/ML
4 INJECTION INTRAMUSCULAR; INTRAVENOUS EVERY 6 HOURS PRN
Status: DISCONTINUED | OUTPATIENT
Start: 2017-12-17 | End: 2017-12-18 | Stop reason: HOSPADM

## 2017-12-17 RX ORDER — IBUPROFEN 600 MG/1
600 TABLET ORAL EVERY 6 HOURS PRN
Status: DISCONTINUED | OUTPATIENT
Start: 2017-12-17 | End: 2017-12-18 | Stop reason: HOSPADM

## 2017-12-17 RX ORDER — SODIUM CHLORIDE, SODIUM LACTATE, POTASSIUM CHLORIDE, CALCIUM CHLORIDE 600; 310; 30; 20 MG/100ML; MG/100ML; MG/100ML; MG/100ML
INJECTION, SOLUTION INTRAVENOUS PRN
Status: DISCONTINUED | OUTPATIENT
Start: 2017-12-17 | End: 2017-12-18 | Stop reason: HOSPADM

## 2017-12-17 RX ORDER — ONDANSETRON 4 MG/1
4 TABLET, ORALLY DISINTEGRATING ORAL EVERY 6 HOURS PRN
Status: DISCONTINUED | OUTPATIENT
Start: 2017-12-17 | End: 2017-12-18 | Stop reason: HOSPADM

## 2017-12-17 RX ADMIN — Medication 2000 ML/HR: at 04:44

## 2017-12-17 RX ADMIN — ROPIVACAINE HYDROCHLORIDE 100 ML: 2 INJECTION, SOLUTION EPIDURAL; INFILTRATION at 02:11

## 2017-12-17 RX ADMIN — IBUPROFEN 600 MG: 600 TABLET, FILM COATED ORAL at 21:52

## 2017-12-17 RX ADMIN — IBUPROFEN 600 MG: 600 TABLET, FILM COATED ORAL at 15:42

## 2017-12-17 RX ADMIN — Medication 125 ML/HR: at 05:36

## 2017-12-17 RX ADMIN — SODIUM CHLORIDE, SODIUM LACTATE, POTASSIUM CHLORIDE, CALCIUM CHLORIDE AND DEXTROSE MONOHYDRATE: 5; 600; 310; 30; 20 INJECTION, SOLUTION INTRAVENOUS at 00:31

## 2017-12-17 RX ADMIN — IBUPROFEN 600 MG: 600 TABLET, FILM COATED ORAL at 07:46

## 2017-12-17 ASSESSMENT — PAIN SCALES - GENERAL
PAINLEVEL_OUTOF10: 4
PAINLEVEL_OUTOF10: 5
PAINLEVEL_OUTOF10: 1
PAINLEVEL_OUTOF10: 0
PAINLEVEL_OUTOF10: 1
PAINLEVEL_OUTOF10: 1
PAINLEVEL_OUTOF10: 5
PAINLEVEL_OUTOF10: 4

## 2017-12-17 NOTE — CARE PLAN
Problem: Pain  Goal: Alleviation of Pain or a reduction in pain to the patient's comfort goal  Outcome: PROGRESSING AS EXPECTED  Pt has an epidural in place at this time, no complaints of pain.    Problem: Risk for injury  Goal: Patient and fetus will be free of preventable injury/complications    Intervention: Monitor Fetal well being  FHTs being monitored continuously

## 2017-12-17 NOTE — CARE PLAN
Problem: Safety  Goal: Will remain free from falls  Outcome: PROGRESSING AS EXPECTED  Bed is in low and locked position, call light within reach and pt calling appropriately for assistance, hourly rounding in place. Pt up with SBA, steady on feet.    Problem: Potential knowledge deficit related to lack of understanding of self and  care  Goal: Patient will verbalize understanding of self and infant care  Outcome: PROGRESSING AS EXPECTED  POC discussed with pt, all questions addressed at this time. Feeding frequencies and amounts reviewed with pt.

## 2017-12-17 NOTE — DELIVERY NOTE
Vaginal Delivery Note    Carol Buitrago is a  3, now para 1, who presented in active phase labor at 40w2d.  Patient progressed in active labor with pitocin augmentation/induction to cervical exam 100%; cervical dilation 10; station +1 to complete the first stage of labor.  Patient then progressed through second stage and delivered spontaneously a viable female infant over an intact perineum at 0441.   Tight nuchal cord present, cut at the perineum. (+) terminal meconium  Infant Apgar 7 and 9, and weight pending.    During the third stage the placenta was delivered spontaneously and was intact with 3 vessels at 0443    Assisted extraction:  none    Perineum repair:  none    Analgesia: n/a    Epidural:  yes    Family support:  yes    Infant bonding:  good    Estimated blood loss:  250 mL    Sponge count correct:  n/a    Patient tolerated the procedure:  excellent

## 2017-12-17 NOTE — PROGRESS NOTES
L&D Progress Note    Name:   Carol Buitrago   Date/Time:  2017 7:35 PM  Gestational Age:  40w1d  Admit Date:   2017  Admitting Dx:   Pregnancy  Indication for care in labor or delivery    Subjective:  Doing well  Comfortable   Objective:   Vitals:    17 1730 17 1828 17 1848 17 1900   BP: 137/75 129/73 131/79    Pulse: 75 82 86    Resp:    18   Temp:    37.1 °C (98.8 °F)   TempSrc:       SpO2:       Weight:       Height:       Cat I  toco - irritable  Cervix 4/100/-2  AROM - clear    Meds:     Labs:  Recent Results (from the past 72 hour(s))   Hold Blood Bank Specimen (Not Tested)    Collection Time: 17  3:00 PM   Result Value Ref Range    Holding Tube - Bb DONE    CBC WITH DIFFERENTIAL    Collection Time: 17  3:00 PM   Result Value Ref Range    WBC 10.7 4.8 - 10.8 K/uL    RBC 4.67 4.20 - 5.40 M/uL    Hemoglobin 13.8 12.0 - 16.0 g/dL    Hematocrit 40.0 37.0 - 47.0 %    MCV 85.7 81.4 - 97.8 fL    MCH 29.6 27.0 - 33.0 pg    MCHC 34.5 33.6 - 35.0 g/dL    RDW 41.0 35.9 - 50.0 fL    Platelet Count 204 164 - 446 K/uL    MPV 10.9 9.0 - 12.9 fL    Neutrophils-Polys 81.30 (H) 44.00 - 72.00 %    Lymphocytes 12.80 (L) 22.00 - 41.00 %    Monocytes 5.20 0.00 - 13.40 %    Eosinophils 0.10 0.00 - 6.90 %    Basophils 0.20 0.00 - 1.80 %    Immature Granulocytes 0.40 0.00 - 0.90 %    Nucleated RBC 0.00 /100 WBC    Neutrophils (Absolute) 8.70 (H) 2.00 - 7.15 K/uL    Lymphs (Absolute) 1.37 1.00 - 4.80 K/uL    Monos (Absolute) 0.56 0.00 - 0.85 K/uL    Eos (Absolute) 0.01 0.00 - 0.51 K/uL    Baso (Absolute) 0.02 0.00 - 0.12 K/uL    Immature Granulocytes (abs) 0.04 0.00 - 0.11 K/uL    NRBC (Absolute) 0.00 K/uL     Assessment:   Gestational Age:   40w1d  Labor State:    Early labor  Start pitocin and titrate  Anticipate     Patient Active Problem List    Diagnosis Date Noted   • Indication for care in labor or delivery 2017   • Normal pregnancy in second trimester 2017        Jerrica Ritter M.D.

## 2017-12-17 NOTE — PROGRESS NOTES
Assumed care of pt at 0700, pt c/o 5/10 cramping in abd. RN administered PRN motrin, pt tolerating well. POC discussed and all questions/concerns addressed. Hourly rounding in place.

## 2017-12-17 NOTE — PROGRESS NOTES
Patient arrives to  from L&D via wheelchair with infant in arms.  Transfers to bed without difficulty.  IV fluids to pump per orders.  Bedside report received from off going RN.  Plan of care discussed, questions answered and understanding verbalized.  Oriented to room, call light, care board, emergency light and erik light. Encouraged to call for assistance or with questions, comments or concerns.  Family members at bedside

## 2017-12-18 VITALS
RESPIRATION RATE: 18 BRPM | BODY MASS INDEX: 27.16 KG/M2 | DIASTOLIC BLOOD PRESSURE: 90 MMHG | OXYGEN SATURATION: 95 % | SYSTOLIC BLOOD PRESSURE: 122 MMHG | HEART RATE: 54 BPM | WEIGHT: 163 LBS | HEIGHT: 65 IN | TEMPERATURE: 97.3 F

## 2017-12-18 PROCEDURE — A9270 NON-COVERED ITEM OR SERVICE: HCPCS | Performed by: OBSTETRICS & GYNECOLOGY

## 2017-12-18 PROCEDURE — 700102 HCHG RX REV CODE 250 W/ 637 OVERRIDE(OP): Performed by: OBSTETRICS & GYNECOLOGY

## 2017-12-18 RX ORDER — IBUPROFEN 600 MG/1
600 TABLET ORAL EVERY 6 HOURS PRN
Qty: 30 TAB | Refills: 1 | Status: SHIPPED | OUTPATIENT
Start: 2017-12-18 | End: 2017-12-25

## 2017-12-18 RX ORDER — IBUPROFEN 600 MG/1
600 TABLET ORAL EVERY 6 HOURS PRN
Qty: 30 TAB | Refills: 1 | Status: SHIPPED | OUTPATIENT
Start: 2017-12-18 | End: 2017-12-18

## 2017-12-18 RX ADMIN — IBUPROFEN 600 MG: 600 TABLET, FILM COATED ORAL at 05:31

## 2017-12-18 ASSESSMENT — PAIN SCALES - GENERAL
PAINLEVEL_OUTOF10: 0
PAINLEVEL_OUTOF10: 5
PAINLEVEL_OUTOF10: 0

## 2017-12-18 ASSESSMENT — LIFESTYLE VARIABLES: DO YOU DRINK ALCOHOL: NO

## 2017-12-18 NOTE — PROGRESS NOTES
Patient awake and alert. FOB at bedside. Bonding with and caring for infant, breast feeding. Medicated per MAR for pain. Tolerating diet and voiding. Post partum education handout given. Educated on monitoring for bleeding. Updated on POC, verbalizes understanding. Call light in reach, reminded to call for assistance as needed, rounding implemented.

## 2017-12-18 NOTE — PROGRESS NOTES
Discharge orders received. Paperwork reviewed and signed. Cuddles and clamp removed. Sleep sack given

## 2017-12-18 NOTE — CARE PLAN
Problem: Altered physiologic condition related to immediate post-delivery state and potential for bleeding/hemorrhage  Goal: Patient physiologically stable as evidenced by normal lochia, palpable uterine involution and vital signs within normal limits  Outcome: PROGRESSING AS EXPECTED  Fundus firm, scant lochia, VSS. Patient educated on s/sx of increased bleeding with understanding.    Problem: Pain Management  Goal: Pain level will decrease to patient's comfort goal  Outcome: PROGRESSING AS EXPECTED  Patient reported 0/10 pain post ibuprofen dose from 4/10 pain due to abdominal cramping. Educated on calling staff with increased pain.

## 2017-12-18 NOTE — DISCHARGE INSTRUCTIONS
POSTPARTUM DISCHARGE INSTRUCTIONS FOR MOM    YOB: 1990   Age: 27 y.o.               Admit Date: 2017     Discharge Date: 2017  Attending Doctor:  Jerrica Newton*                  Allergies:  Patient has no known allergies.    Discharged to home by car. Discharged via wheelchair, hospital escort: Yes.  Special equipment needed: Not Applicable  Belongings with: Personal  Be sure to schedule a follow-up appointment with your primary care doctor or any specialists as instructed.     Discharge Plan:   Diet Plan: Discussed  Activity Level: Discussed  Confirmed Follow up Appointment: Appointment Scheduled  Confirmed Symptoms Management: Discussed  Medication Reconciliation Updated: Yes  Influenza Vaccine Indication: Patient Refuses    REASONS TO CALL YOUR OBSTETRICIAN:  1.   Persistent fever or shaking chills (Temperature higher than 100.4)  2.   Heavy bleeding (soaking more than 1 pad per hour); Passing clots  3.   Foul odor from vagina  4.   Mastitis (Breast infection; breast pain, chills, fever, redness)  5.   Urinary pain, burning or frequency  6.   Episiotomy infection  7.   Abdominal incision infection  8.   Severe depression longer than 24 hours    HAND WASHING  · Prior to handling the baby.  · Before breastfeeding or bottle feeding baby.  · After using the bathroom or changing the baby's diaper.    WOUND CARE  Ask your physician for additional care instructions.  In general:    ·  Incision:      · Keep clean and dry.    · Do NOT lift anything heavier than your baby for up to 6 weeks.    · There should not be any opening or pus.      VAGINAL CARE  · Nothing inside vagina for 6 weeks: no sexual intercourse, tampons or douching.  · Bleeding may continue for 2-4 weeks.  Amount may vary.    · Call your physician for heavy bleeding which means soaking more than 1 pad per hour    BIRTH CONTROL  · It is possible to become pregnant at any time after delivery and while  "breastfeeding.  · Plan to discuss a method of birth control with your physician at your follow up visit. visit.    DIET AND ELIMINATION  · Eating more fiber (bran cereal, fruits, and vegetables) and drinking plenty of fluids will help to avoid constipation.  · Urinary frequency after childbirth is normal.    POSTPARTUM BLUES  During the first few days after birth, you may experience a sense of the \"blues\" which may include impatience, irritability or even crying.  These feeling come and go quickly.  However, as many as 1 in 10 women experience emotional symptoms known as postpartum depression.    Postpartum depression:  May start as early as the second or third day after delivery or take several weeks or months to develop.  Symptoms of \"blues\" are present, but are more intense:  Crying spells; loss of appetite; feelings of hopelessness or loss of control; fear of touching the baby; over concern or no concern at all about the baby; little or no concern about your own appearance/caring for yourself; and/or inability to sleep or excessive sleeping.  Contact your physician if you are experiencing any of these symptoms.    Crisis Hotline:  · [place] Crisis Hotline:  0-691-QJEQIEL  Or 1-830.142.3281  · [place] Crisis Hotline:  1-141.328.2504  Or 473-080-8174    PREVENTING SHAKEN BABY:  If you are angry or stressed, PUT THE BABY IN THE CRIB, step away, take some deep breaths, and wait until you are calm to care for the baby.  DO NOT SHAKE THE BABY.  You are not alone, call a supporter for help.    · Crisis Call Center 24/7 crisis line 111-675-9889 or 1-212.656.4894  · You can also text them, text \"ANSWER\" to 774544    QUIT SMOKING/TOBACCO USE:  I understand the use of any tobacco products increases my chance of suffering from future heart disease and could cause other illnesses which may shorten my life. Quitting the use of tobacco products is the single most important thing I can do to improve my health. For further " information on smoking / tobacco cessation call a Toll Free Quit Line at 1-177.308.4971 (*National [employer] [place]) or 1-353.309.9623 (American Lung Association) or you can access the web based program at www.lungusa.org.    · [place] Tobacco Users Help Line:  (464) 658-5847       Toll Free: 1-911.341.7606  · Quit Tobacco Program [facility] (145)089-6174    DEPRESSION / SUICIDE RISK:  As you are discharged from this [facility], it is important to learn how to keep safe from harming yourself.    Recognize the warning signs:  · Abrupt changes in personality, positive or negative- including increase in energy   · Giving away possessions  · Change in eating patterns- significant weight changes-  positive or negative  · Change in sleeping patterns- unable to sleep or sleeping all the time   · Unwillingness or inability to communicate  · Depression  · Unusual sadness, discouragement and loneliness  · Talk of wanting to die  · Neglect of personal appearance   · Rebelliousness- reckless behavior  · Withdrawal from people/activities they love  · Confusion- inability to concentrate     If you or a loved one observes any of these behaviors or has concerns about self-harm, here's what you can do:  · Talk about it- your feelings and reasons for harming yourself  · Remove any means that you might use to hurt yourself (examples: pills, rope, extension cords, firearm)  · Get professional help from the community (Mental Health, Substance Abuse, psychological counseling)  · Do not be alone:Call your Safe Contact- someone whom you trust who will be there for you.  · Call your local CRISIS HOTLINE 793-4603 or 095-865-8993  · Call your local Children's Mobile Crisis Response Team [place] (134) 008-4969 or www.TheOfficialBoard  · Call the toll free National Suicide Prevention Hotlines   · National Suicide Prevention Lifeline 497-299-MNXT (7001)  · National Hope Line Network 800-SUICIDE  (107-5637)    DISCHARGE SURVEY:  Thank you for choosing [facility].  We hope we provided you with very good care.  You may be receiving a survey in the mail.  Please fill it out.  Your opinion is valuable to us.    ADDITIONAL EDUCATIONAL MATERIALS GIVEN TO PATIENT:        My signature on this form indicates that:  1.  I have reviewed and understand the above information  2.  My questions regarding this information have been answered to my satisfaction.  3.  I have formulated a plan with my discharge nurse to obtain my prescribed medication for home.

## 2017-12-18 NOTE — CONSULTS
Assisted with breastfeeding on both breasts, infant able to attach deeply with audible swallows from both breasts, reviewed signs of deep versus shallow latch with mother. Mother reports left breast has been easier than right breast, discussed need to stimulate right breast with pump or hand expression and make outpatient follow-up appointment if latch difficulties persist at home. Mother feels cross cradle position works well at this time. All questions answered.

## 2017-12-18 NOTE — DISCHARGE SUMMARY
Discharge Summary:      Carol Buitrago      Admit Date:   2017  Discharge Date:  2017     Admitting diagnosis:  Pregnancy  Indication for care in labor or delivery  Discharge Diagnosis: Status post vaginal, spontaneous.  Pregnancy Complications: none  Tubal Ligation:  no        History:  History reviewed. No pertinent past medical history.  OB History    Para Term  AB Living   3 0 0 0 2 0   SAB TAB Ectopic Molar Multiple Live Births   1 1 0   0        # Outcome Date GA Lbr Saad/2nd Weight Sex Delivery Anes PTL Lv   3 Current            2 SAB            1 TAB                    Patient has no known allergies.  Patient Active Problem List    Diagnosis Date Noted   • Indication for care in labor or delivery 2017   • Normal pregnancy in second trimester 2017        Hospital Course:   27 y.o. , now para 1, was admitted with the above mentioned diagnosis, underwent Active Labor, vaginal, spontaneous. Patient postpartum course was unremarkable, with progressive advancement in diet , ambulation and toleration of oral analgesia. Patient without complaints today and desires discharge.      Vitals:    17 0623 17 0800 17 1700 17   BP: 132/77 119/76 109/68 134/89   Pulse: 63 62 60 78   Resp: 18 18    Temp: 36.9 °C (98.4 °F) 36.6 °C (97.8 °F) 36.4 °C (97.6 °F) 36.2 °C (97.1 °F)   TempSrc:       SpO2: 96% 95% 94% 95%   Weight:       Height:           Current Facility-Administered Medications   Medication Dose   • ondansetron (ZOFRAN ODT) dispertab 4 mg  4 mg    Or   • ondansetron (ZOFRAN) syringe/vial injection 4 mg  4 mg   • oxytocin (PITOCIN) infusion (for postpartum)   mL/hr   • ibuprofen (MOTRIN) tablet 600 mg  600 mg   • oxycodone-acetaminophen (PERCOCET) 5-325 MG per tablet 1 Tab  1 Tab   • oxycodone-acetaminophen (PERCOCET) 5-325 MG per tablet 2 Tab  2 Tab   • LR infusion     • PRN oxytocin (PITOCIN) (20 Units/1000 mL) PRN for excessive  uterine bleeding - See Admin Instr  125-999 mL/hr   • docusate sodium (COLACE) capsule 100 mg  100 mg       Exam:  Breast Exam: Inspection negative. No nipple discharge or bleeding. No masses or nodularity palpable  Abdomen: Abdomen soft, non-tender. BS normal. No masses,  No organomegaly  Fundus Non Tender: yes  Incision: none  Perineum: perineum intact  Extremity: extremities, peripheral pulses and reflexes normal     Labs:  Recent Labs      12/16/17   1500  12/17/17   1224   WBC  10.7  20.4*   RBC  4.67  4.06*   HEMOGLOBIN  13.8  11.9*   HEMATOCRIT  40.0  35.0*   MCV  85.7  86.2   MCH  29.6  29.3   MCHC  34.5  34.0   RDW  41.0  41.9   PLATELETCT  204  167   MPV  10.9  10.7        Activity:   Discharge to home  Pelvic Rest x 6 weeks    Assessment:  normal postpartum course  Discharge Assessment: Taking adequate diet and fluids     [facility] up: [facility] in 5 weeks for vaginal ; 1 week for incision check.      Discharge Meds:   Current Outpatient Prescriptions   Medication Sig Dispense Refill   • ibuprofen (MOTRIN) 600 MG Tab Take 1 Tab by mouth every 6 hours as needed (For cramping after delivery; do not give if patient is receiving ketorolac (Toradol)). 30 Tab 1       Skip Saxena M.D.

## 2018-01-18 ENCOUNTER — TELEPHONE (OUTPATIENT)
Dept: OBGYN | Facility: CLINIC | Age: 28
End: 2018-01-18

## 2018-01-18 NOTE — TELEPHONE ENCOUNTER
Paperwork completed, routed,  and waiting for a signature. Called and informed roque and she stated she would print it and have Nayla to have Dr. Thomas sign and fax it to the employer.

## 2018-01-18 NOTE — TELEPHONE ENCOUNTER
----- Message from Roshni Castillo sent at 1/18/2018 11:09 AM PST -----  Regarding: questions  Contact: [phone]  Patient called says following up on your conversation on my chart. Patient is requesting a call back thank you.

## 2018-01-31 ENCOUNTER — POST PARTUM (OUTPATIENT)
Dept: OBGYN | Facility: CLINIC | Age: 28
End: 2018-01-31
Payer: COMMERCIAL

## 2018-01-31 ENCOUNTER — HOSPITAL ENCOUNTER (OUTPATIENT)
Facility: MEDICAL CENTER | Age: 28
End: 2018-01-31
Attending: OBSTETRICS & GYNECOLOGY
Payer: COMMERCIAL

## 2018-01-31 VITALS
WEIGHT: 147 LBS | HEIGHT: 65 IN | DIASTOLIC BLOOD PRESSURE: 80 MMHG | BODY MASS INDEX: 24.49 KG/M2 | SYSTOLIC BLOOD PRESSURE: 118 MMHG

## 2018-01-31 DIAGNOSIS — N90.89 VULVAR LESION: ICD-10-CM

## 2018-01-31 PROCEDURE — 88175 CYTOPATH C/V AUTO FLUID REDO: CPT

## 2018-01-31 PROCEDURE — 87591 N.GONORRHOEAE DNA AMP PROB: CPT

## 2018-01-31 PROCEDURE — 90050 PR POSTPARTUM VISIT: CPT | Performed by: OBSTETRICS & GYNECOLOGY

## 2018-01-31 PROCEDURE — 87491 CHLMYD TRACH DNA AMP PROBE: CPT

## 2018-01-31 RX ORDER — LEVONORGESTREL AND ETHINYL ESTRADIOL 0.1-0.02MG
1 KIT ORAL DAILY
Qty: 28 TAB | Refills: 12 | Status: SHIPPED | OUTPATIENT
Start: 2018-01-31 | End: 2018-04-17 | Stop reason: SDUPTHER

## 2018-01-31 NOTE — PROGRESS NOTES
Carol Buitrago is a 27 y.o.  s/p vaginal, spontaneous on 17. Patient is here today for her postpartum exam. Currently patient is with complaints at this time. Patient has multiple small skin tags last possible genital warts on the left inguinal region, desires removal and/or treatment Patient is not breast-feeding. Patient has not had menses yet. The patient desires BCP Alesse    Well-developed well-nourished female in no apparent distress  Heart regular rate and rhythm  Lungs clear to auscultation bilaterally  Breasts exam and nontender not engorged no dominant masses  Extremities show no clubbing cyanosis or edema    Normal external female genitalia,  Patient has multiple approximately 20 small skin tag's located on the left inguinal area and one small skin tag noted on the right vulva, possible external genital warts  Well-healed from delivery  Cervix is normal  Uterus approximately 8-10 weeks in size nontender  Adnexa bilaterally normal with no dominant masses    Status post vaginal, spontaneous  Doing well without complaints  BCP Alesse is prescribed  Followup in 2-3 weeks removal of vulvar lesions

## 2018-02-01 LAB
C TRACH DNA GENITAL QL NAA+PROBE: NEGATIVE
CYTOLOGY REG CYTOL: NORMAL
N GONORRHOEA DNA GENITAL QL NAA+PROBE: NEGATIVE
SPECIMEN SOURCE: NORMAL

## 2018-03-30 ENCOUNTER — GYNECOLOGY VISIT (OUTPATIENT)
Dept: OBGYN | Facility: CLINIC | Age: 28
End: 2018-03-30
Payer: COMMERCIAL

## 2018-03-30 ENCOUNTER — HOSPITAL ENCOUNTER (OUTPATIENT)
Facility: MEDICAL CENTER | Age: 28
End: 2018-03-30
Attending: OBSTETRICS & GYNECOLOGY
Payer: COMMERCIAL

## 2018-03-30 VITALS
SYSTOLIC BLOOD PRESSURE: 110 MMHG | DIASTOLIC BLOOD PRESSURE: 70 MMHG | HEIGHT: 65 IN | BODY MASS INDEX: 23.32 KG/M2 | WEIGHT: 140 LBS

## 2018-03-30 DIAGNOSIS — N90.89 VULVAR LESION: ICD-10-CM

## 2018-03-30 PROCEDURE — 88305 TISSUE EXAM BY PATHOLOGIST: CPT

## 2018-03-30 PROCEDURE — 56606 BIOPSY OF VULVA/PERINEUM: CPT | Performed by: OBSTETRICS & GYNECOLOGY

## 2018-03-30 PROCEDURE — 56605 BIOPSY OF VULVA/PERINEUM: CPT | Performed by: OBSTETRICS & GYNECOLOGY

## 2018-03-30 NOTE — PROGRESS NOTES
"Subjective:      Carol Buitrago is a 27 y.o. female who presents with Procedure            27-year-old  0-1 who is here today for removal of vulvar lesions. Patient has had these lesions since her pregnancy. She desires definitive removal at this time.      Procedure note  Indications for removal of the lesions were discussed with patient  Lesions may be external genital warts versus skin tag  Discussed with patient will be sent for pathology  Informed consent was signed  Multiple small skin tag like lesions were noted mostly on the left groin area as well as a few on wants  The anterior was cleansed with Betadine swabs infiltrated with 1% lidocaine with epinephrine at the lesions were removed using scissors  Lesions will be sent for pathology  Pressure was applied to the bleeding areas were covered by bandages  Patient is asked to leave bandages in place for 6 hours  No tub baths for 2 weeks  Keep area clean and dry for 24 hours        ROS       Objective:     /70   Ht 1.651 m (5' 5\")   Wt 63.5 kg (140 lb)   LMP 2018   BMI 23.30 kg/m²      Physical Exam   Genitourinary:                     Assessment/Plan:     1. Vulvar lesion  Follow-up in 2-3 weeks for evaluation as well as review of pathology, potential removal of more lesions  - Consent for Surgery / Procedure  - PATHOLOGY SPECIMEN; Future      "

## 2018-04-17 ENCOUNTER — GYNECOLOGY VISIT (OUTPATIENT)
Dept: OBGYN | Facility: CLINIC | Age: 28
End: 2018-04-17
Payer: COMMERCIAL

## 2018-04-17 VITALS
BODY MASS INDEX: 23.32 KG/M2 | SYSTOLIC BLOOD PRESSURE: 100 MMHG | HEIGHT: 65 IN | DIASTOLIC BLOOD PRESSURE: 62 MMHG | WEIGHT: 140 LBS

## 2018-04-17 DIAGNOSIS — A63.0 CONDYLOMA OF FEMALE GENITALIA: ICD-10-CM

## 2018-04-17 PROCEDURE — 99212 OFFICE O/P EST SF 10 MIN: CPT | Performed by: OBSTETRICS & GYNECOLOGY

## 2018-04-17 RX ORDER — PODOFILOX 5 MG/G
GEL TOPICAL
Qty: 3.5 G | Refills: 0 | Status: SHIPPED
Start: 2018-04-17 | End: 2020-03-12

## 2018-04-17 RX ORDER — LEVONORGESTREL AND ETHINYL ESTRADIOL 0.1-0.02MG
1 KIT ORAL DAILY
Qty: 28 TAB | Refills: 10 | Status: SHIPPED | OUTPATIENT
Start: 2018-04-17 | End: 2018-09-20

## 2018-04-17 NOTE — PROGRESS NOTES
"Chief complaint vulvar biopsy follow-up    27-year-old  0-1 who is here today for follow-up of removal of vulvar lesions. Patient had multiple vulvar lesions biopsied and excised at last visit. Patient is currently without complaints and states that the areas are healing up well. She states that she does have a few more areas that seemed abnormal to her. 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

## 2018-07-02 ENCOUNTER — PATIENT MESSAGE (OUTPATIENT)
Dept: OBGYN | Facility: CLINIC | Age: 28
End: 2018-07-02

## 2018-07-02 NOTE — TELEPHONE ENCOUNTER
From: Carol Buitrago  To: Desiree Brooks M.D.  Sent: 7/2/2018 12:06 PM PDT  Subject: Non-Urgent Medical Question    Hello, It’s been a week after my period and I started having really bad cramps yesterday and i’ve Been bleeding but not too heavy, it’s been kind of clumpy too, just wanted to see if I should be concerned about this. I take the pill for birth control regularly every day.    Thank you,

## 2018-07-02 NOTE — TELEPHONE ENCOUNTER
Good afternoon Carol, Sorry to hear your having these symptoms. Any chance that you missed a pill? Have you noticed any other changes in your cycle? Recent stressors,travel or other medical issues?

## 2018-08-06 ENCOUNTER — GYNECOLOGY VISIT (OUTPATIENT)
Dept: OBGYN | Facility: MEDICAL CENTER | Age: 28
End: 2018-08-06
Payer: COMMERCIAL

## 2018-08-06 VITALS
SYSTOLIC BLOOD PRESSURE: 130 MMHG | DIASTOLIC BLOOD PRESSURE: 78 MMHG | WEIGHT: 133 LBS | HEIGHT: 65 IN | BODY MASS INDEX: 22.16 KG/M2

## 2018-08-06 DIAGNOSIS — N92.6 IRREGULAR MENSTRUATION: ICD-10-CM

## 2018-08-06 LAB
INT CON NEG: NEGATIVE
INT CON POS: POSITIVE
POC URINE PREGNANCY TEST: NEGATIVE

## 2018-08-06 PROCEDURE — 81025 URINE PREGNANCY TEST: CPT | Performed by: OBSTETRICS & GYNECOLOGY

## 2018-08-06 NOTE — PROGRESS NOTES
"S: 28 yo  lmp 18 on Sronyx for contraception presents with complaint of irregular bleeding.  She has been taking the ocps for 6 months, and menses are usually regular and light.  She had a periood 7/5/18 x 7 days associated with severe cramping, then bled again  for 2 weeks associated with nausea.  No other complaints.  Denies hx of migraines, htn, thrombosis.    O:Blood pressure 130/78, height 1.651 m (5' 5\"), weight 60.3 kg (133 lb), last menstrual period 2018, not currently breastfeeding.    Exam deferred    A/P: Breakthrough bleeding on ocps.  Will check upt to r/o pregnancy.  Recommended switching to a higher dose pill.  Patient declines.  Recommended pelvic US to r/o cyst, pt declines.  Plan to continue current ocp regimen and observe.    F/U prn.  "

## 2018-09-20 ENCOUNTER — GYNECOLOGY VISIT (OUTPATIENT)
Dept: OBGYN | Facility: MEDICAL CENTER | Age: 28
End: 2018-09-20
Payer: COMMERCIAL

## 2018-09-20 VITALS
BODY MASS INDEX: 21.83 KG/M2 | DIASTOLIC BLOOD PRESSURE: 70 MMHG | HEIGHT: 65 IN | SYSTOLIC BLOOD PRESSURE: 122 MMHG | WEIGHT: 131 LBS

## 2018-09-20 DIAGNOSIS — N92.1 BREAKTHROUGH BLEEDING ON BIRTH CONTROL PILLS: ICD-10-CM

## 2018-09-20 LAB
INT CON NEG: NEGATIVE
INT CON POS: POSITIVE
POC URINE PREGNANCY TEST: NEGATIVE

## 2018-09-20 PROCEDURE — 99213 OFFICE O/P EST LOW 20 MIN: CPT | Performed by: OBSTETRICS & GYNECOLOGY

## 2018-09-20 PROCEDURE — 81025 URINE PREGNANCY TEST: CPT | Performed by: OBSTETRICS & GYNECOLOGY

## 2018-09-20 RX ORDER — NORGESTIMATE AND ETHINYL ESTRADIOL 0.25-0.035
1 KIT ORAL DAILY
Qty: 28 TAB | Refills: 12 | Status: CANCELLED | OUTPATIENT
Start: 2018-09-20

## 2018-09-20 RX ORDER — NORGESTIMATE AND ETHINYL ESTRADIOL 0.25-0.035
1 KIT ORAL DAILY
Qty: 28 TAB | Refills: 12 | Status: SHIPPED
Start: 2018-09-20 | End: 2020-03-12

## 2018-09-20 NOTE — PROGRESS NOTES
"S: 28 yo  lmp 8/15/18 presents with complaint of irregular vaginal bleeding.  She is on ocps and taking them regularly.  The last period was 6 days, followed by an additional 16 days of heavy bleeding, described as \"chunky\".  She has no bleeding currently.  Also complains of cramping while on menses.  No vaginal discharge.  No nausea or vomiting.     O:Blood pressure 122/70, height 1.651 m (5' 5\"), weight 59.4 kg (131 lb), last menstrual period 08/15/2018, not currently breastfeeding.      A/P:  1. Breakthrough bleeding on 20 mcg ocp - Will change to orthocyclen.  Finish current pack and begin higher dose ocps.  Discussed side effects of combination ocps.  Early side effects include bloating, nausea, and breast tenderness, which generally resolve with time.  Additional risks include breakthrough bleeding and venous thromboembolism.  OCPs decrease the risk of uterine and ovarian cancer.  The data on breast cancer risk is conflicting. Pelvic US ordered.     Will call with results.   "

## 2018-09-29 ENCOUNTER — APPOINTMENT (OUTPATIENT)
Dept: RADIOLOGY | Facility: MEDICAL CENTER | Age: 28
End: 2018-09-29
Attending: OBSTETRICS & GYNECOLOGY
Payer: COMMERCIAL

## 2018-10-14 ENCOUNTER — HOSPITAL ENCOUNTER (OUTPATIENT)
Dept: RADIOLOGY | Facility: MEDICAL CENTER | Age: 28
End: 2018-10-14
Attending: OBSTETRICS & GYNECOLOGY
Payer: COMMERCIAL

## 2018-10-14 DIAGNOSIS — N92.1 BREAKTHROUGH BLEEDING ON BIRTH CONTROL PILLS: ICD-10-CM

## 2018-10-14 PROCEDURE — 76830 TRANSVAGINAL US NON-OB: CPT

## 2018-10-17 ENCOUNTER — TELEPHONE (OUTPATIENT)
Dept: OBGYN | Facility: CLINIC | Age: 28
End: 2018-10-17

## 2018-10-17 NOTE — TELEPHONE ENCOUNTER
Pt called wanting her u/s results. Per Dr. Sorenson's notes pt advised that her u/s came back normal. Pt verbalized understanding and had no other questions or concerns.

## 2018-10-19 ENCOUNTER — TELEPHONE (OUTPATIENT)
Dept: OBGYN | Facility: CLINIC | Age: 28
End: 2018-10-19

## 2018-10-19 NOTE — TELEPHONE ENCOUNTER
----- Message from Johana Sorenson M.D. sent at 10/16/2018  2:54 PM PDT -----  Please inform patient that pelvic US was normal.

## 2019-01-10 ENCOUNTER — APPOINTMENT (OUTPATIENT)
Dept: NEUROLOGY | Facility: MEDICAL CENTER | Age: 29
End: 2019-01-10
Payer: COMMERCIAL

## 2019-03-04 ENCOUNTER — OFFICE VISIT (OUTPATIENT)
Dept: NEUROLOGY | Facility: MEDICAL CENTER | Age: 29
End: 2019-03-04
Payer: COMMERCIAL

## 2019-03-04 VITALS
TEMPERATURE: 98.2 F | DIASTOLIC BLOOD PRESSURE: 80 MMHG | BODY MASS INDEX: 21.99 KG/M2 | HEART RATE: 68 BPM | SYSTOLIC BLOOD PRESSURE: 120 MMHG | WEIGHT: 132 LBS | HEIGHT: 65 IN | RESPIRATION RATE: 16 BRPM | OXYGEN SATURATION: 98 %

## 2019-03-04 DIAGNOSIS — G40.909 SEIZURE CEREBRAL (HCC): ICD-10-CM

## 2019-03-04 DIAGNOSIS — G43.109 COMPLICATED MIGRAINE: ICD-10-CM

## 2019-03-04 PROCEDURE — 99203 OFFICE O/P NEW LOW 30 MIN: CPT | Performed by: PSYCHIATRY & NEUROLOGY

## 2019-03-04 ASSESSMENT — PATIENT HEALTH QUESTIONNAIRE - PHQ9: CLINICAL INTERPRETATION OF PHQ2 SCORE: 0

## 2019-03-04 NOTE — PROGRESS NOTES
NEUROLOGY NOTE    Referring Physician  Krzysztof Breen M.D      CHIEF COMPLAINT:  The last spell : May 2018, one spell of numbness, lightheadness, blurred vision-- followed by headache-- it took one hour   The first time, 2013  The patient had around 4 spells   Chief Complaint   Patient presents with   • Establish Care     Headache        PRESENT ILLNESS:   The last spell : May 2018, one spell of numbness, lightheadness, blurred vision-- followed by headache-- it took one hour   The first time, 2013  The patient had around 4 spells     The patient did not choose to go to the hospital--- the patient made an appointment by herself --- self referral last year  The patient denied any medical problems  The patient did not know too much of the family Hx        PAST MEDICAL HISTORY:  No past medical history on file.    PAST SURGICAL HISTORY:  No past surgical history on file.    FAMILY HISTORY:  Family History   Problem Relation Age of Onset   • Hypertension Father        SOCIAL HISTORY:  Social History     Social History   • Marital status:      Spouse name: N/A   • Number of children: N/A   • Years of education: N/A     Occupational History   • Not on file.     Social History Main Topics   • Smoking status: Never Smoker   • Smokeless tobacco: Never Used   • Alcohol use Yes   • Drug use: No   • Sexual activity: Yes     Partners: Male     Other Topics Concern   • Not on file     Social History Narrative   • No narrative on file     ALLERGIES:  No Known Allergies  TOBHX  History   Smoking Status   • Never Smoker   Smokeless Tobacco   • Never Used     ALCHX  History   Alcohol Use   • Yes     DRUGHX  History   Drug Use No           MEDICATIONS:  Current Outpatient Prescriptions   Medication   • norgestimate-ethinyl estradiol (ORTHO-CYCLEN) 0.25-35 MG-MCG per tablet   • podofilox (CONDYLOX) 0.5 % gel     No current facility-administered medications for this visit.        REVIEW OF SYSTEM:    Constitutional: Denies  "fevers, Denies weight changes   Eyes: Denies changes in vision, no eye pain   Ears/Nose/Throat/Mouth: Denies nasal congestion or sore throat   Cardiovascular: Denies chest pain or palpitations   Respiratory: Denies SOB.   Gastrointestinal/Hepatic: Denies abdominal pain, nausea, vomiting, diarrhea, constipation or GI bleeding   Genitourinary: Denies bladder dysfunction, dysuria or frequency   Musculoskeletal/Rheum: Denies joint pain and swelling   Skin/Breast: Denies rash, denies breast lumps or discharge   Neurological: spells of tingling, numbness and blurred vision and followed by headache since 2013  Psychiatric: denies mood disorder   Endocrine: denies hx of diabetes or thyroid dysfunction   Heme/Oncology/Lymph Nodes: Denies enlarged lymph nodes, denies brusing or known bleeding disorder   Allergic/Immunologic: Denies hx of allergies         PHYSICAL AND NEUROLOGICAL EXMAINATIONS:  VITAL SIGNS: /80 (BP Location: Right arm, Patient Position: Sitting, BP Cuff Size: Adult)   Pulse 68   Temp 36.8 °C (98.2 °F) (Temporal)   Resp 16   Ht 1.651 m (5' 5\")   Wt 59.9 kg (132 lb)   SpO2 98%   BMI 21.97 kg/m²   CURRENT WEIGHT:   BMI: Body mass index is 21.97 kg/m².  PREVIOUS WEIGHTS:  Wt Readings from Last 25 Encounters:   03/04/19 59.9 kg (132 lb)   09/20/18 59.4 kg (131 lb)   08/06/18 60.3 kg (133 lb)   04/17/18 63.5 kg (140 lb)   03/30/18 63.5 kg (140 lb)   01/31/18 66.7 kg (147 lb)   12/16/17 73.9 kg (163 lb)   12/15/17 73.9 kg (163 lb)   11/21/17 73 kg (161 lb)   11/17/17 73 kg (161 lb)   11/08/17 73.5 kg (162 lb)   11/02/17 73 kg (161 lb)   10/18/17 70.8 kg (156 lb)   10/04/17 69.9 kg (154 lb)   09/20/17 68.5 kg (151 lb)   08/14/17 67.1 kg (148 lb)   06/23/17 63.5 kg (140 lb)   01/30/17 65.3 kg (144 lb)   01/03/17 65.8 kg (145 lb 1 oz)   09/06/16 64.4 kg (142 lb)   02/08/16 66.2 kg (146 lb)   10/21/15 63 kg (139 lb)       General appearance of patient: WDWN(+) NAD(+)    EYES  o Fundus : Papilledem(-) " Exudates(-) Hemorrhage(-)  Nervous System  Orientation to time, place and person(+)  Memory normal(+)  Language: aphasia(-)  Knowledge: past(+) Current(+)  Attention(+)  Cranial Nerves  • Nerve 2: intact  • Nerve 3,4,6: intact  • Nerve 5 : intact  • Nerve 7: intact  • Nerve 8: intact  • Nerve 9 & 10: intact  • Nerve 11: intact  • Nerve 12: intact  Muscle Power and muscle tone: symmetric, normal in upper and lower  Sensory System: Pin sensation intact(+)  Reflexes: symmetric throughout  Cerebellar Function FNP normal   Gait : Steady(+) TandemGait steady(+)  Heart and Vascular  Peripheral Vasucular system : Edema (-) Swelling(-)  RHB, Breathing sound clear  abdomen bowel sound normoactive  Extremities freely moveable  Joints no contracture       NEUROIMAGING:      LAB:        Self referral    IMPRESSION:    1. Spells of tingling, numbness of head, blurred vision and followed by headache-- lasting for one hour or so since 2013-- totally around 4 times     PLAN/RECOMMENDATIONS:    ________________________________________________________________________        Regarding the spells of tingling, blurred vision and headache-- the following are possible hypthesis    A. Migraine variants-- similar to hemiplegia migraine  B. Subtle seizures  C. TIA-- small strokes ( unlikely, since the symptoms are positive and the patient has no other stroke risk factors)  D. Psychogenic ( unlikely )    We will get MRI of Brain and EEG to investigate the causes  Could contact us one week after these tests        ________________________________________________________________________            SIGNATURE:  Camille Rodriguez    CC:  Krzysztof Breen M.D.

## 2019-03-31 ENCOUNTER — HOSPITAL ENCOUNTER (OUTPATIENT)
Dept: RADIOLOGY | Facility: MEDICAL CENTER | Age: 29
End: 2019-03-31
Attending: PSYCHIATRY & NEUROLOGY
Payer: COMMERCIAL

## 2019-03-31 DIAGNOSIS — G43.109 COMPLICATED MIGRAINE: ICD-10-CM

## 2019-03-31 DIAGNOSIS — G40.909 SEIZURE CEREBRAL (HCC): ICD-10-CM

## 2019-03-31 PROCEDURE — 70551 MRI BRAIN STEM W/O DYE: CPT

## 2019-04-12 ENCOUNTER — TELEPHONE (OUTPATIENT)
Dept: NEUROLOGY | Facility: MEDICAL CENTER | Age: 29
End: 2019-04-12

## 2019-08-07 ENCOUNTER — GYNECOLOGY VISIT (OUTPATIENT)
Dept: OBGYN | Facility: MEDICAL CENTER | Age: 29
End: 2019-08-07
Payer: COMMERCIAL

## 2019-08-07 DIAGNOSIS — N93.8 DUB (DYSFUNCTIONAL UTERINE BLEEDING): ICD-10-CM

## 2019-08-07 DIAGNOSIS — Z32.01 POSITIVE PREGNANCY TEST: ICD-10-CM

## 2019-08-07 LAB
IN CLINIC OB SCAN: NORMAL
INT CON NEG: NEGATIVE
INT CON POS: POSITIVE
POC URINE PREGNANCY TEST: POSITIVE

## 2019-08-07 PROCEDURE — 81025 URINE PREGNANCY TEST: CPT | Performed by: OBSTETRICS & GYNECOLOGY

## 2019-08-07 PROCEDURE — 76830 TRANSVAGINAL US NON-OB: CPT | Performed by: OBSTETRICS & GYNECOLOGY

## 2019-08-07 PROCEDURE — 99214 OFFICE O/P EST MOD 30 MIN: CPT | Mod: 25 | Performed by: OBSTETRICS & GYNECOLOGY

## 2019-08-07 NOTE — PROGRESS NOTES
CC: Confirmation of Pregnancy    HPI: Pt is a 29 yo  lmp 6/15/19 who presents for evaluation of amenorrhea.  She has had some minor spotting for the last 3 days.  A urine pregnancy test was positive.  She denies pelvic pain.  She notes nausea and vomiting.      ROS: negative for dizziness, SOB, chest pain, palpitations, dysuria, vaginal discharge.    There were no vitals taken for this visit.    GENERAL: Alert, in no apparent distress  PSYCHIATRIC: Appropriate affect, intact insight and judgement.  ABDOMEN: Soft, nontender, nondistended.  No palpable masses. No hepatosplenomegaly.   No rebound or guarding.  No inguinal lymphadenopathy.  BACK: No CVA tenderness  EXTREMITIES: No edema, no calf tenderness.    GENITOURINARY:  Normal external genitalia, no lesions.  Normal urethral meatus, no masses or tenderness.  Normal bladder without fullness or masses.  Vagina well estrogenized, no vaginal discharge or lesions.  No blood in vault.   Cervix normal length, nontender, no active bleeding.   Uterus normal size, shape, and contour, nontender.  Adnexa nontender, no masses.  Normal anus and perineum.      TRANSVAGINAL ULTRASOUND - performed and interpreted by me    Single gestational sac present.  Yolk sac present    Santos intrauterine pregnancy with CRL measuring 0.60 cm, c/w 6+3/7 weeks EGA, positive fetal cardiac activity noted. EDC 3/29/19     No fluid in cul de sac.    Ovaries and cervix appear grossly normal. Cervical length = 3.84 cm.        ASSESSMENT/PLAN:  1. DUB visit - Santos IUP at 6+3/7 wks, EDC will be 3/29/19.  Prenatal Vitamins.  F/U for NOB  Appointment 6 weeks.

## 2019-09-18 ENCOUNTER — HOSPITAL ENCOUNTER (OUTPATIENT)
Facility: MEDICAL CENTER | Age: 29
End: 2019-09-18
Attending: OBSTETRICS & GYNECOLOGY
Payer: COMMERCIAL

## 2019-09-18 ENCOUNTER — INITIAL PRENATAL (OUTPATIENT)
Dept: OBGYN | Facility: MEDICAL CENTER | Age: 29
End: 2019-09-18
Payer: COMMERCIAL

## 2019-09-18 VITALS — SYSTOLIC BLOOD PRESSURE: 100 MMHG | DIASTOLIC BLOOD PRESSURE: 60 MMHG | BODY MASS INDEX: 21.47 KG/M2 | WEIGHT: 129 LBS

## 2019-09-18 DIAGNOSIS — Z34.91 NORMAL PREGNANCY IN FIRST TRIMESTER: ICD-10-CM

## 2019-09-18 PROCEDURE — 87491 CHLMYD TRACH DNA AMP PROBE: CPT

## 2019-09-18 PROCEDURE — 59401 PR NEW OB VISIT: CPT | Performed by: OBSTETRICS & GYNECOLOGY

## 2019-09-18 PROCEDURE — 87591 N.GONORRHOEAE DNA AMP PROB: CPT

## 2019-09-18 NOTE — PROGRESS NOTES
Pt. Here for NOB visit today.  # [phone]  First prenatal care  Pt. States no concerns  Pharmacy verified  Chaperone offered and accepted

## 2019-09-18 NOTE — PROGRESS NOTES
Cc: New OB visit    HPI:  The patient is a 28 y.o.  @ 12w3d by 6wk US here for OB care.      She presents for her new obstetric visit.  Currently feels well and is without concerns.  He had nausea but that has improved.      denies personal or FOB family history of genetic abnormalities, congenital abnormalities.      ROS:  gen: denies general concerns  CV/resp: denies history of cardiac/respiratory issues.  abd: denies nausea/vomiting.  Denies abd pain.  GYN: Denies vaginal bleeding        OB History    Para Term  AB Living   4 1 1 0 2 1   SAB TAB Ectopic Molar Multiple Live Births   1 1 0   0 1      # Outcome Date GA Lbr Saad/2nd Weight Sex Delivery Anes PTL Lv   4 Current            3 Term 17 40w2d  3.12 kg (6 lb 14.1 oz) F Vag-Spont  N MARILIA   2 SAB            1 TAB                GYNHx:  Menses regular  Denies hx of STIs  Denies hx of abnormal paps, last  NILM    PMHx: denies  PSHx: denies    Allergies:   Allergies as of 2019   • (No Known Allergies)     Family History   Problem Relation Age of Onset   • Hypertension Father    • No Known Problems Mother    • No Known Problems Sister      Social History     Tobacco Use   • Smoking status: Never Smoker   • Smokeless tobacco: Never Used   Substance Use Topics   • Alcohol use: Not Currently   • Drug use: No     Meds: PNV      PE:    /60   Wt 58.5 kg (129 lb)   Gen: AAO, NAD  Resp: ctab  CV: RRR   Abd: soft, NT   FHTs 160  : NEFG, normal vagina and cervix  Ext: NT, no edema      LABS: Pending    A/P:  28 y.o. @ 12w3d   by 6wk .  She is here for her new obstetric visit.    1. Normal pregnancy in first trimester  Chlamydia/GC PCR Urine Or Swab    PRENATAL PANEL 3+HIV+UACXI      - NOB labs rx given  - discussed carrier screening, offered CF/SMA carrier screening which patient declined  - discussed aneuploidy screening, patient unsure, leaning against.  Will readdress at next visit.  -  PNV daily  -Recommend flu shot  which patien to baby with flu shot during pregnancy.  Patient declines.  T declines.  Discussed increased risk of respiratory morbidity and mortality with influenza and pregnancy as well as benefits    -Discussed that is okay to exercise and pregnancy, can do what she is comfortable with, to avoid activities with high risk of abdominal trauma  -Recommend to avoid hot tubs.  Discussed that a moderate bathwater temperature is okay.    PNL prior to next visit    F/U 4wks    Citlali Adam MD  [facility], Women's Health

## 2019-09-19 DIAGNOSIS — Z34.91 NORMAL PREGNANCY IN FIRST TRIMESTER: ICD-10-CM

## 2019-09-19 LAB
C TRACH DNA SPEC QL NAA+PROBE: POSITIVE
N GONORRHOEA DNA SPEC QL NAA+PROBE: NEGATIVE
SPECIMEN SOURCE: ABNORMAL

## 2019-09-20 DIAGNOSIS — O98.311 CHLAMYDIA TRACHOMATIS INFECTION IN MOTHER DURING FIRST TRIMESTER OF PREGNANCY: ICD-10-CM

## 2019-09-20 DIAGNOSIS — A56.8 CHLAMYDIA TRACHOMATIS INFECTION IN MOTHER DURING FIRST TRIMESTER OF PREGNANCY: ICD-10-CM

## 2019-09-20 RX ORDER — AZITHROMYCIN 500 MG/1
1000 TABLET, FILM COATED ORAL ONCE
Qty: 2 TAB | Refills: 0 | Status: SHIPPED | OUTPATIENT
Start: 2019-09-20 | End: 2019-09-20

## 2019-09-23 ENCOUNTER — TELEPHONE (OUTPATIENT)
Dept: OBGYN | Facility: MEDICAL CENTER | Age: 29
End: 2019-09-23

## 2019-09-23 NOTE — TELEPHONE ENCOUNTER
----- Message from Citlali Adam M.D. sent at 9/20/2019  2:03 PM PDT -----  + chlamydia; will treat and will need repeat GC CT in approximately 2 months    MA: please let pt know she is + for chlamydia and needs to be treated, I have sent a rx for abx to her pharmacy.  All sexual partners need to be treated as well.  If she would like me to prescribe the antibiotic for her partner I can do this, I need his date of birth and name and they can  a paper prescription when I am in the office next week if that is what she desires.  He also could be treated by his own physician.      Pt notified.  Pt notified regarding positive chlamydia and need to  Rx from her pharmacy and Advised pt make a note of the date she took medication because she needs to be retested 4 wks after she had taken meds. Advised for her partner to be treated with his PCP or [facility]. Pt verbalized understanding.   HD form and lab results faxed to Carolyn at [facility].

## 2019-10-16 ENCOUNTER — ROUTINE PRENATAL (OUTPATIENT)
Dept: OBGYN | Facility: MEDICAL CENTER | Age: 29
End: 2019-10-16
Payer: COMMERCIAL

## 2019-10-16 VITALS — BODY MASS INDEX: 20.97 KG/M2 | SYSTOLIC BLOOD PRESSURE: 102 MMHG | WEIGHT: 126 LBS | DIASTOLIC BLOOD PRESSURE: 64 MMHG

## 2019-10-16 DIAGNOSIS — A74.9 CHLAMYDIA: ICD-10-CM

## 2019-10-16 DIAGNOSIS — Z34.82 ENCOUNTER FOR SUPERVISION OF OTHER NORMAL PREGNANCY, SECOND TRIMESTER: Primary | ICD-10-CM

## 2019-10-16 PROCEDURE — 90040 PR PRENATAL FOLLOW UP: CPT | Performed by: NURSE PRACTITIONER

## 2019-10-16 RX ORDER — DOCUSATE SODIUM 100 MG/1
100 CAPSULE, LIQUID FILLED ORAL 2 TIMES DAILY PRN
Qty: 60 CAP | Refills: 1 | Status: ON HOLD
Start: 2019-10-16 | End: 2020-03-29

## 2019-10-16 NOTE — PROGRESS NOTES
SUBJECTIVE:  Pt is a 28 y.o.   at 16w3d  gestation. Presents today for follow-up prenatal care. Took script for chlamydia as did partner. Reports increased white discharge with no itch/burn/odor. Has some nasal congestion but difficult for her to tell if allergy or nasal passages are swollen.  Reports feeling positive  fetal movement. Denies cramping/contractions, bleeding or leaking of fluid. Denies dysuria, headaches, N/V. Generally feels well today. Recent ER or L&D visits - none.     OBJECTIVE:  - See prenatal vitals flow  -   Vitals:    10/16/19 1518   BP: 102/64   Weight: 57.2 kg (126 lb)      Fundal Height - 16  FHT - 155  US to be scheduled  Prenatal labs to be drawn including AFP              ASSESSMENT:   - IUP at 16w3d    - S=D   -   Patient Active Problem List    Diagnosis Date Noted   • Chlamydia needs test of cure 10/16/2019   • Seizure cerebral 2019   • Complicated migraine 2019   • Indication for care in labor or delivery 2017   • Normal pregnancy in second trimester 2017         PLAN:  - S/sx pregnancy and labor warning signs vs general discomforts discussed  - Fetal movements and kick counts reviewed   - Adequate hydration reinforced  - Nutrition/exercise/vitamin education; continued PNV  - Encouraged tour of LnD/childbirth education classes  - We will do test of cure for chlamydia next visit.   Patient will schedule ultrasound and will get labs caught up.

## 2019-10-16 NOTE — PROGRESS NOTES
Pt here today for OB follow up  Pt states denies VB and LOF  Reports +FM  Good # [phone]  Pharmacy Confirmed.  Declined Flu Shot

## 2019-11-09 ENCOUNTER — HOSPITAL ENCOUNTER (OUTPATIENT)
Dept: LAB | Facility: MEDICAL CENTER | Age: 29
End: 2019-11-09
Attending: OBSTETRICS & GYNECOLOGY
Payer: COMMERCIAL

## 2019-11-09 DIAGNOSIS — Z34.91 NORMAL PREGNANCY IN FIRST TRIMESTER: ICD-10-CM

## 2019-11-09 DIAGNOSIS — Z34.82 ENCOUNTER FOR SUPERVISION OF OTHER NORMAL PREGNANCY, SECOND TRIMESTER: ICD-10-CM

## 2019-11-09 LAB
ABO GROUP BLD: NORMAL
APPEARANCE UR: CLEAR
BACTERIA #/AREA URNS HPF: ABNORMAL /HPF
BASOPHILS # BLD AUTO: 0.1 % (ref 0–1.8)
BASOPHILS # BLD: 0.01 K/UL (ref 0–0.12)
BILIRUB UR QL STRIP.AUTO: NEGATIVE
BLD GP AB SCN SERPL QL: NORMAL
COLOR UR: YELLOW
EOSINOPHIL # BLD AUTO: 0.11 K/UL (ref 0–0.51)
EOSINOPHIL NFR BLD: 1.2 % (ref 0–6.9)
EPI CELLS #/AREA URNS HPF: ABNORMAL /HPF
ERYTHROCYTE [DISTWIDTH] IN BLOOD BY AUTOMATED COUNT: 43.6 FL (ref 35.9–50)
GLUCOSE UR STRIP.AUTO-MCNC: NEGATIVE MG/DL
HBV SURFACE AG SER QL: NEGATIVE
HCT VFR BLD AUTO: 39.4 % (ref 37–47)
HGB BLD-MCNC: 13.4 G/DL (ref 12–16)
HIV 1+2 AB+HIV1 P24 AG SERPL QL IA: NON REACTIVE
HYALINE CASTS #/AREA URNS LPF: ABNORMAL /LPF
IMM GRANULOCYTES # BLD AUTO: 0.04 K/UL (ref 0–0.11)
IMM GRANULOCYTES NFR BLD AUTO: 0.5 % (ref 0–0.9)
KETONES UR STRIP.AUTO-MCNC: NEGATIVE MG/DL
LEUKOCYTE ESTERASE UR QL STRIP.AUTO: ABNORMAL
LYMPHOCYTES # BLD AUTO: 1.62 K/UL (ref 1–4.8)
LYMPHOCYTES NFR BLD: 18.3 % (ref 22–41)
MCH RBC QN AUTO: 30.2 PG (ref 27–33)
MCHC RBC AUTO-ENTMCNC: 34 G/DL (ref 33.6–35)
MCV RBC AUTO: 88.9 FL (ref 81.4–97.8)
MICRO URNS: ABNORMAL
MONOCYTES # BLD AUTO: 0.43 K/UL (ref 0–0.85)
MONOCYTES NFR BLD AUTO: 4.8 % (ref 0–13.4)
NEUTROPHILS # BLD AUTO: 6.66 K/UL (ref 2–7.15)
NEUTROPHILS NFR BLD: 75.1 % (ref 44–72)
NITRITE UR QL STRIP.AUTO: NEGATIVE
NRBC # BLD AUTO: 0 K/UL
NRBC BLD-RTO: 0 /100 WBC
PH UR STRIP.AUTO: 6 [PH] (ref 5–8)
PLATELET # BLD AUTO: 256 K/UL (ref 164–446)
PMV BLD AUTO: 10.1 FL (ref 9–12.9)
PROT UR QL STRIP: NEGATIVE MG/DL
RBC # BLD AUTO: 4.43 M/UL (ref 4.2–5.4)
RBC # URNS HPF: ABNORMAL /HPF
RBC UR QL AUTO: NEGATIVE
RH BLD: NORMAL
RUBV AB SER QL: 15.8 IU/ML
SP GR UR STRIP.AUTO: 1.03
TREPONEMA PALLIDUM IGG+IGM AB [PRESENCE] IN SERUM OR PLASMA BY IMMUNOASSAY: NON REACTIVE
UROBILINOGEN UR STRIP.AUTO-MCNC: 1 MG/DL
WBC # BLD AUTO: 8.9 K/UL (ref 4.8–10.8)
WBC #/AREA URNS HPF: ABNORMAL /HPF

## 2019-11-09 PROCEDURE — 81511 FTL CGEN ABNOR FOUR ANAL: CPT

## 2019-11-09 PROCEDURE — 85025 COMPLETE CBC W/AUTO DIFF WBC: CPT

## 2019-11-09 PROCEDURE — 87340 HEPATITIS B SURFACE AG IA: CPT

## 2019-11-09 PROCEDURE — 36415 COLL VENOUS BLD VENIPUNCTURE: CPT

## 2019-11-09 PROCEDURE — 86850 RBC ANTIBODY SCREEN: CPT

## 2019-11-09 PROCEDURE — 86762 RUBELLA ANTIBODY: CPT

## 2019-11-09 PROCEDURE — 87389 HIV-1 AG W/HIV-1&-2 AB AG IA: CPT

## 2019-11-09 PROCEDURE — 86780 TREPONEMA PALLIDUM: CPT

## 2019-11-09 PROCEDURE — 86901 BLOOD TYPING SEROLOGIC RH(D): CPT

## 2019-11-09 PROCEDURE — 86900 BLOOD TYPING SEROLOGIC ABO: CPT

## 2019-11-09 PROCEDURE — 81001 URINALYSIS AUTO W/SCOPE: CPT

## 2019-11-13 ENCOUNTER — HOSPITAL ENCOUNTER (OUTPATIENT)
Facility: MEDICAL CENTER | Age: 29
End: 2019-11-13
Attending: NURSE PRACTITIONER
Payer: COMMERCIAL

## 2019-11-13 ENCOUNTER — ROUTINE PRENATAL (OUTPATIENT)
Dept: OBGYN | Facility: MEDICAL CENTER | Age: 29
End: 2019-11-13
Payer: COMMERCIAL

## 2019-11-13 VITALS — DIASTOLIC BLOOD PRESSURE: 76 MMHG | WEIGHT: 127 LBS | BODY MASS INDEX: 21.13 KG/M2 | SYSTOLIC BLOOD PRESSURE: 110 MMHG

## 2019-11-13 DIAGNOSIS — Z20.2 CHLAMYDIA CONTACT, TREATED: Primary | ICD-10-CM

## 2019-11-13 DIAGNOSIS — Z20.2 CHLAMYDIA CONTACT, TREATED: ICD-10-CM

## 2019-11-13 LAB
# FETUSES US: NORMAL
AFP MOM SERPL: 1.6
AFP SERPL-MCNC: 100 NG/ML
AGE - REPORTED: 27.3 YR
CURRENT SMOKER: NO
FAMILY MEMBER DISEASES HX: NO
GA METHOD: NORMAL
GA: NORMAL WK
HCG MOM SERPL: 1.28
HCG SERPL-ACNC: NORMAL IU/L
HX OF HEREDITARY DISORDERS: NO
IDDM PATIENT QL: NO
INHIBIN A MOM SERPL: 0.6
INHIBIN A SERPL-MCNC: 112 PG/ML
INTEGRATED SCN PATIENT-IMP: NORMAL
PATHOLOGY STUDY: NORMAL
SPECIMEN DRAWN SERPL: NORMAL
U ESTRIOL MOM SERPL: 0.78
U ESTRIOL SERPL-MCNC: 1.91 NG/ML

## 2019-11-13 PROCEDURE — 90040 PR PRENATAL FOLLOW UP: CPT | Performed by: NURSE PRACTITIONER

## 2019-11-13 PROCEDURE — 87510 GARDNER VAG DNA DIR PROBE: CPT

## 2019-11-13 PROCEDURE — 87480 CANDIDA DNA DIR PROBE: CPT

## 2019-11-13 PROCEDURE — 87591 N.GONORRHOEAE DNA AMP PROB: CPT

## 2019-11-13 PROCEDURE — 87660 TRICHOMONAS VAGIN DIR PROBE: CPT

## 2019-11-13 PROCEDURE — 87491 CHLMYD TRACH DNA AMP PROBE: CPT

## 2019-11-13 NOTE — PROGRESS NOTES
SUBJECTIVE:  Pt is a 28 y.o.   at 20w3d  gestation. Presents today for follow-up prenatal care. Reports some discomfort with intercourse.   Reports feeling fetal movement. Denies cramping/contractions, bleeding or leaking of fluid. Denies dysuria, headaches, N/V. Generally feels well today. Recent ER or L&D visits - none.     OBJECTIVE:  - See prenatal vitals flow  -   Vitals:    19 1454   BP: 110/76   Weight: 57.6 kg (127 lb)      Fundal Height - 20  FHT - 160  US scheduled for Saturday  Prenatal labs normal              ASSESSMENT:   - IUP at 20w3d    - S=D   -   Patient Active Problem List    Diagnosis Date Noted   • Chlamydia needs test of cure 10/16/2019   • Seizure cerebral 2019   • Complicated migraine 2019   • Indication for care in labor or delivery 2017   • Normal pregnancy in second trimester 2017         PLAN:  - S/sx pregnancy and labor warning signs vs general discomforts discussed  - Fetal movements and kick counts reviewed   - Adequate hydration reinforced  - Nutrition/exercise/vitamin education; continued PNV  - Encouraged tour of LnD/childbirth education classes   GC/chlamydia test of cure today. Vaginal pathogens swab to lab.  If she is experiencing vaginal dryness, try water based lubricant.

## 2019-11-13 NOTE — PROGRESS NOTES
Pt here today for OB follow up  Pt states denies VB and LOF  Reports +FM   Good # [phone]  Pharmacy Confirmed.

## 2019-11-14 LAB
CANDIDA DNA VAG QL PROBE+SIG AMP: NEGATIVE
G VAGINALIS DNA VAG QL PROBE+SIG AMP: NEGATIVE
T VAGINALIS DNA VAG QL PROBE+SIG AMP: NEGATIVE

## 2019-11-16 ENCOUNTER — HOSPITAL ENCOUNTER (OUTPATIENT)
Dept: RADIOLOGY | Facility: MEDICAL CENTER | Age: 29
End: 2019-11-16
Attending: NURSE PRACTITIONER
Payer: COMMERCIAL

## 2019-11-16 DIAGNOSIS — Z34.82 ENCOUNTER FOR SUPERVISION OF OTHER NORMAL PREGNANCY, SECOND TRIMESTER: ICD-10-CM

## 2019-11-16 PROCEDURE — 76805 OB US >/= 14 WKS SNGL FETUS: CPT

## 2019-12-11 ENCOUNTER — ROUTINE PRENATAL (OUTPATIENT)
Dept: OBGYN | Facility: CLINIC | Age: 29
End: 2019-12-11
Payer: COMMERCIAL

## 2019-12-11 VITALS — WEIGHT: 133 LBS | BODY MASS INDEX: 22.13 KG/M2 | DIASTOLIC BLOOD PRESSURE: 78 MMHG | SYSTOLIC BLOOD PRESSURE: 129 MMHG

## 2019-12-11 DIAGNOSIS — Z34.82 ENCOUNTER FOR SUPERVISION OF OTHER NORMAL PREGNANCY, SECOND TRIMESTER: Primary | ICD-10-CM

## 2019-12-11 PROCEDURE — 90040 PR PRENATAL FOLLOW UP: CPT | Performed by: NURSE PRACTITIONER

## 2019-12-11 NOTE — PROGRESS NOTES
Pt here today for OB follow up  Pt states no complaints   Reports +FM   Good # [phone]   Pharmacy Confirmed.  Chaperone offered and provided.   1 hr gtt given today w/instructions.

## 2019-12-11 NOTE — PROGRESS NOTES
SUBJECTIVE:  Pt is a 29 y.o.   at 24w3d  gestation. Presents today for follow-up prenatal care. Reports no issues at this time.  Reports feeling very good  fetal movement. Denies cramping/contractions, bleeding or leaking of fluid. Denies dysuria, headaches, N/V. Generally feels well today. Recent ER or L&D visits - none.     OBJECTIVE:  - See prenatal vitals flow  -   Vitals:    19 1430   BP: 129/78   Weight: 60.3 kg (133 lb)      Fundal Height - 24  FHT - 155  US normal  Prenatal labs normal pnp, neg AFP, negative chlamydia test of cure.              ASSESSMENT:   - IUP at 24w3d    - S=D   -   Patient Active Problem List    Diagnosis Date Noted   • Chlamydia needs test of cure 10/16/2019   • Seizure cerebral 2019   • Complicated migraine 2019   • Indication for care in labor or delivery 2017   • Normal pregnancy in second trimester 2017         PLAN:  - S/sx pregnancy and labor warning signs vs general discomforts discussed  - Fetal movements and kick counts reviewed   - Adequate hydration reinforced  - Nutrition/exercise/vitamin education; continued PNV  - Encouraged tour of LnD/childbirth education classes  - lab slip for glucose testing with explanation.

## 2019-12-28 ENCOUNTER — HOSPITAL ENCOUNTER (OUTPATIENT)
Dept: LAB | Facility: MEDICAL CENTER | Age: 29
End: 2019-12-28
Attending: NURSE PRACTITIONER
Payer: COMMERCIAL

## 2019-12-28 DIAGNOSIS — Z34.82 ENCOUNTER FOR SUPERVISION OF OTHER NORMAL PREGNANCY, SECOND TRIMESTER: ICD-10-CM

## 2019-12-28 LAB
BASOPHILS # BLD AUTO: 0.4 % (ref 0–1.8)
BASOPHILS # BLD: 0.02 K/UL (ref 0–0.12)
EOSINOPHIL # BLD AUTO: 0.02 K/UL (ref 0–0.51)
EOSINOPHIL NFR BLD: 0.4 % (ref 0–6.9)
ERYTHROCYTE [DISTWIDTH] IN BLOOD BY AUTOMATED COUNT: 45.7 FL (ref 35.9–50)
GLUCOSE 1H P 50 G GLC PO SERPL-MCNC: 127 MG/DL (ref 70–139)
HCT VFR BLD AUTO: 41.3 % (ref 37–47)
HGB BLD-MCNC: 13.5 G/DL (ref 12–16)
IMM GRANULOCYTES # BLD AUTO: 0.04 K/UL (ref 0–0.11)
IMM GRANULOCYTES NFR BLD AUTO: 0.7 % (ref 0–0.9)
LYMPHOCYTES # BLD AUTO: 0.9 K/UL (ref 1–4.8)
LYMPHOCYTES NFR BLD: 15.8 % (ref 22–41)
MCH RBC QN AUTO: 30.3 PG (ref 27–33)
MCHC RBC AUTO-ENTMCNC: 32.7 G/DL (ref 33.6–35)
MCV RBC AUTO: 92.6 FL (ref 81.4–97.8)
MONOCYTES # BLD AUTO: 0.5 K/UL (ref 0–0.85)
MONOCYTES NFR BLD AUTO: 8.8 % (ref 0–13.4)
NEUTROPHILS # BLD AUTO: 4.22 K/UL (ref 2–7.15)
NEUTROPHILS NFR BLD: 73.9 % (ref 44–72)
NRBC # BLD AUTO: 0 K/UL
NRBC BLD-RTO: 0 /100 WBC
PLATELET # BLD AUTO: 194 K/UL (ref 164–446)
PMV BLD AUTO: 10.3 FL (ref 9–12.9)
RBC # BLD AUTO: 4.46 M/UL (ref 4.2–5.4)
TREPONEMA PALLIDUM IGG+IGM AB [PRESENCE] IN SERUM OR PLASMA BY IMMUNOASSAY: NON REACTIVE
WBC # BLD AUTO: 5.7 K/UL (ref 4.8–10.8)

## 2019-12-28 PROCEDURE — 36415 COLL VENOUS BLD VENIPUNCTURE: CPT

## 2019-12-28 PROCEDURE — 82950 GLUCOSE TEST: CPT

## 2019-12-28 PROCEDURE — 86780 TREPONEMA PALLIDUM: CPT

## 2019-12-28 PROCEDURE — 85025 COMPLETE CBC W/AUTO DIFF WBC: CPT

## 2020-01-03 ENCOUNTER — ROUTINE PRENATAL (OUTPATIENT)
Dept: OBGYN | Facility: CLINIC | Age: 30
End: 2020-01-03
Payer: COMMERCIAL

## 2020-01-03 VITALS — BODY MASS INDEX: 22.47 KG/M2 | DIASTOLIC BLOOD PRESSURE: 68 MMHG | WEIGHT: 135 LBS | SYSTOLIC BLOOD PRESSURE: 119 MMHG

## 2020-01-03 DIAGNOSIS — Z34.92 NORMAL PREGNANCY IN SECOND TRIMESTER: ICD-10-CM

## 2020-01-03 PROCEDURE — 90040 PR PRENATAL FOLLOW UP: CPT | Performed by: OBSTETRICS & GYNECOLOGY

## 2020-01-03 NOTE — LETTER
"Count Your Baby's Movements  Another step to a healthy delivery    Carol Buitrago             Dept: [phone]    How Many Weeks Pregnant? 27w5d    Date to Begin Countin/3/2019              How to use this chart    One way for your physician to keep track of your baby's health is by knowing how often the baby moves (or \"kicks\") in your womb.  You can help your physician to do this by using this chart every day.    Every day, you should see how many hours it takes for your baby to move 10 times.  Start in the morning, as soon as you get up.    · First, write down the time your baby moves until you get to 10.  · Check off one box every time your baby moves until you get to 10.  · Write down the time you finished counting in the last column.  · Total how long it took to count up all 10 movements.  · Finally, fill in the box that shows how long this took.  After counting 10 movements, you no longer have to count any more that day.  The next morning, just start counting again as soon as you get up.    What should you call a \"movement\"?  It is hard to say, because it will feel different from one mother to another and from one pregnancy to the next.  The important thing is that you count the movements the same way throughout your pregnancy.  If you have more questions, you should ask your physician.    Count carefully every day!  SAMPLE:  Week 28    How many hours did it take to feel 10 movements?       Start  Time     1     2     3     4     5     6     7     8     9     10   Finish Time   Mon 8:20 ·  ·  ·  ·  ·  ·  ·  ·  ·  ·  11:40                  Sat               Sun                 IMPORTANT: You should contact your physician if it takes more than two hours for you to feel 10 movements.  Each morning, write down the time and start to count the movements of your baby.  Keep track by checking off one box every time you feel one movement.  When you have felt " "10 \"kicks\", write down the time you finished counting in the last column.  Then fill in the   box (over the check sabrina) for the number of hours it took.  Be sure to read the complete instructions on the previous page.            "

## 2020-01-03 NOTE — PROGRESS NOTES
S: Pt presents for routine OB follow up.  No contractions, vaginal bleeding, or leaking fluids. Good fetal movement.  Patient states she notices she has burning on urination immediately after having intercourse however has not noticed burning or itchiness outside of this event.  She has also recently started using a synthetic lubricant too.     O: /68   Wt 61.2 kg (135 lb)   LMP 06/15/2019   BMI 22.47 kg/m²   Vitals:    20 1454   BP: 119/68   Weight: 61.2 kg (135 lb)     Vitals, fundal height , fetal position, and FHR reviewed on flowsheet    Patient Active Problem List   Diagnosis   • Normal pregnancy in second trimester   • Indication for care in labor or delivery   • Seizure cerebral   • Complicated migraine   • Chlamydia needs test of cure       SVE: deferred  FH: 150bpm  Fundal height: 27cm    Lab:  Recent Labs     19  1318 19  1330   ABOGROUP  --  O   RUBELLAIGG 15.80  --    HEPBSAG Negative  --        Prenatal labs:  T&S: O+/ ab neg  First TM labs: wnl  Genetic screening: quad screen negative  1 hour: passed 123  GBS: na    Ultrasounds:  Level II: normal anatomy    A/P:  29 y.o.  at 27w5d based on 6wk US presents for routine obstetric follow-up.     #Prenatal care  - Delivery plan: anticipate vaginal  - Continue prenatal vitamins.  -  labor precautions and fetal kick counts at 28 weeks.  - advised to use lubrication during intercourse as her symptoms are likely related to irritation and not an infection. If having itching or thick curdy discharge she is to return for testing.    #Health Maintenance   - declines Tdap today and would like to read further about it. Advised that this immunization is for  protection.    - Pap smear: NILM on 2018    - Follow-up in 3-4 weeks.

## 2020-01-03 NOTE — PROGRESS NOTES
Pt here today for OB follow up  Pt states irritation after sex and some discharge   Reports +FM  Good # [phone]  Pharmacy Confirmed.  Chaperone offered and provided.    Pt declined tdap today would like to be asked at next visit

## 2020-01-17 ENCOUNTER — ROUTINE PRENATAL (OUTPATIENT)
Dept: OBGYN | Facility: CLINIC | Age: 30
End: 2020-01-17
Payer: COMMERCIAL

## 2020-01-17 VITALS — DIASTOLIC BLOOD PRESSURE: 72 MMHG | WEIGHT: 136 LBS | BODY MASS INDEX: 22.63 KG/M2 | SYSTOLIC BLOOD PRESSURE: 117 MMHG

## 2020-01-17 DIAGNOSIS — Z34.83 ENCOUNTER FOR SUPERVISION OF OTHER NORMAL PREGNANCY IN THIRD TRIMESTER: ICD-10-CM

## 2020-01-17 PROCEDURE — 90715 TDAP VACCINE 7 YRS/> IM: CPT | Performed by: OBSTETRICS & GYNECOLOGY

## 2020-01-17 PROCEDURE — 90040 PR PRENATAL FOLLOW UP: CPT | Performed by: OBSTETRICS & GYNECOLOGY

## 2020-01-17 PROCEDURE — 90471 IMMUNIZATION ADMIN: CPT | Performed by: OBSTETRICS & GYNECOLOGY

## 2020-01-17 NOTE — PROGRESS NOTES
KATLYN:  29w5d    Pt reports doing well.  Reports +FM, Denies vaginal bleeding, contractions, LOF.    /72   Wt 61.7 kg (136 lb)   LMP 06/15/2019   BMI 22.63 kg/m²   gen: AAO, NAD  FHTs: 125  FH: 29    A/P: 29 y.o.  @ 29w5d by 6wk US  - PNL up to date, Rh+/-; 3rd tri labs wnl; anatomy US wnl  - Tdap today  - Discussed risks of flu in pregnancy including respiratory failure and death, also discussed benefits to baby w/ decreased risk of flu and inability of baby to be vaccinated for flu until 6 mos of age.  Recommend to get at pharmacy if amenable  - [facility] [place] at 32wks.  Discussed can travel if no complications but risk of getting stuck with baby in NICU of complications develop while traveling.   - +CT in Sept, neg test of reinfection 2019; consider recheck next visit or 2     RTC 2wks    Citlali Adam MD  [facility], Women's Health

## 2020-01-17 NOTE — PROGRESS NOTES
Pt here today for OB follow up  Pt states no complaints   Reports +FM  Good # [phone]  Pharmacy Confirmed.  Chaperone offered and provided.    NDC: 38012-950-87  LOT#: [id number]  Expiration Date: 10/08/21    Dose: 0.5mL  Site: Right Deltoid    Patient educated on use and side effects of medication. Name and  verified prior to injection. Pt tolerated? yes     Administered by Daja Whitley, Med Ass't at 9:54 AM.    Patient Provided Medication: no

## 2020-01-31 ENCOUNTER — ROUTINE PRENATAL (OUTPATIENT)
Dept: OBGYN | Facility: CLINIC | Age: 30
End: 2020-01-31
Payer: COMMERCIAL

## 2020-01-31 VITALS — BODY MASS INDEX: 22.96 KG/M2 | WEIGHT: 138 LBS | DIASTOLIC BLOOD PRESSURE: 59 MMHG | SYSTOLIC BLOOD PRESSURE: 104 MMHG

## 2020-01-31 DIAGNOSIS — Z34.83 ENCOUNTER FOR SUPERVISION OF OTHER NORMAL PREGNANCY, THIRD TRIMESTER: Primary | ICD-10-CM

## 2020-01-31 PROCEDURE — 90040 PR PRENATAL FOLLOW UP: CPT | Performed by: NURSE PRACTITIONER

## 2020-01-31 NOTE — PROGRESS NOTES
Pt here today for OB follow up  Pt states has some louie latham during walks and at night   Pt denies VB, LOF, and contractions   Reports +FM  Good # [phone]  Pharmacy Confirmed.  Chaperone offered and provided.

## 2020-01-31 NOTE — PROGRESS NOTES
S) Pt is a 29 y.o.   at 31w5d  gestation. Routine prenatal care today. Pt having some BH ctx when she walks a lot but otherwise feeling well.  Having some trouble sleeping so feeling tired.   Fetal movement Normal  Cramping no  VB no  LOF no   Denies dysuria. Generally feels well today. Good self-care activities identified. Denies headaches, swelling, visual changes, or epigastric pain .     O) /59   Wt 62.6 kg (138 lb)         Labs: WNL       PNL: +chlamydia with neg DEVIN        GCT:127     AFP: normal       GBS: N/A       Pertinent ultrasound -  19 CHIKI 10.8, survey WNL, c/w prev dating           A) IUP at 31w5d       S=D         Patient Active Problem List    Diagnosis Date Noted   • Chlamydia needs test of cure 10/16/2019   • Seizure cerebral 2019   • Complicated migraine 2019   • Indication for care in labor or delivery 2017   • Normal pregnancy in second trimester 2017          SVE: deferred         TDAP: yes       FLU: no        BTL: no       : no       C/S Consent: no       IOL or C/S scheduled: no       LAST PAP: 18 negative         P) s/s ptl vs general discomforts. Fetal movements reviewed. General ed and anticipatory guidance. Nutrition/exercise/vitamin. Plans breast Plans pp contraception- unsure  Continue PNV.   Discussed unisom, benedryl and sleep hygiene.    RTC 2 weeks or PRN.

## 2020-02-14 ENCOUNTER — ROUTINE PRENATAL (OUTPATIENT)
Dept: OBGYN | Facility: CLINIC | Age: 30
End: 2020-02-14
Payer: COMMERCIAL

## 2020-02-14 VITALS — DIASTOLIC BLOOD PRESSURE: 74 MMHG | SYSTOLIC BLOOD PRESSURE: 104 MMHG | BODY MASS INDEX: 23.13 KG/M2 | WEIGHT: 139 LBS

## 2020-02-14 DIAGNOSIS — Z34.93 NORMAL PREGNANCY IN THIRD TRIMESTER: Primary | ICD-10-CM

## 2020-02-14 PROCEDURE — 90040 PR PRENATAL FOLLOW UP: CPT | Performed by: NURSE PRACTITIONER

## 2020-02-14 NOTE — PROGRESS NOTES
Pt here today for OB follow up  Pt states no complaints   Reports +FM  Good # [phone]  Pharmacy Confirmed.  Chaperone offered and provided.

## 2020-02-14 NOTE — PROGRESS NOTES
S) Pt is a 29 y.o.   at 33w5d  gestation. Routine prenatal care today. No concerns today. Discussed GBS at 36 weeks.  labor precautions reviewed, all questions answered.    Fetal movement Normal  Cramping no  VB no  LOF no   Denies dysuria. Generally feels well today. Good self-care activities identified. Denies headaches, swelling, visual changes, or epigastric pain .     O) /74   Wt 63 kg (139 lb)         Labs:       PNL: WNL       GCT: 127        AFP: normal       GBS: N/A       Pertinent ultrasound -        19- Survey WNL, CHIKI 10.8cm, c/w prev dating.    A) IUP at 33w5d       S=D         Patient Active Problem List    Diagnosis Date Noted   • Chlamydia needs test of cure 10/16/2019   • Seizure cerebral 2019   • Complicated migraine 2019   • Indication for care in labor or delivery 2017   • Normal pregnancy in third trimester 2017          SVE: deferred       Chaperone offered: n/a         TDAP: yes       FLU: no        BTL: no       : n/a       C/S Consent: n/a       IOL or C/S scheduled: no       LAST PAP: 18- negative         P) s/s ptl vs general discomforts. Fetal movements reviewed. General ed and anticipatory guidance. Nutrition/exercise/vitamin. Plans breast Plans pp contraception- unsure  Continue PNV.

## 2020-03-05 ENCOUNTER — ROUTINE PRENATAL (OUTPATIENT)
Dept: OBGYN | Facility: CLINIC | Age: 30
End: 2020-03-05
Payer: COMMERCIAL

## 2020-03-05 ENCOUNTER — HOSPITAL ENCOUNTER (OUTPATIENT)
Facility: MEDICAL CENTER | Age: 30
End: 2020-03-05
Attending: OBSTETRICS & GYNECOLOGY
Payer: COMMERCIAL

## 2020-03-05 VITALS — WEIGHT: 140 LBS | DIASTOLIC BLOOD PRESSURE: 70 MMHG | BODY MASS INDEX: 23.3 KG/M2 | SYSTOLIC BLOOD PRESSURE: 114 MMHG

## 2020-03-05 DIAGNOSIS — Z34.83 ENCOUNTER FOR SUPERVISION OF OTHER NORMAL PREGNANCY, THIRD TRIMESTER: ICD-10-CM

## 2020-03-05 PROBLEM — Z34.93 NORMAL PREGNANCY IN THIRD TRIMESTER: Status: RESOLVED | Noted: 2017-09-20 | Resolved: 2020-03-05

## 2020-03-05 PROCEDURE — 87150 DNA/RNA AMPLIFIED PROBE: CPT

## 2020-03-05 PROCEDURE — 90040 PR PRENATAL FOLLOW UP: CPT | Performed by: OBSTETRICS & GYNECOLOGY

## 2020-03-05 PROCEDURE — 87081 CULTURE SCREEN ONLY: CPT

## 2020-03-05 NOTE — PROGRESS NOTES
Pt here today for OB follow up  Pt states no complaints   Reports +FM  Good # [phone]  Pharmacy Confirmed w/ pt   GBS today

## 2020-03-06 NOTE — PROGRESS NOTES
S: Pt presents for routine OB follow up.  Reports normal fetal movement.  Denies headaches or scotoma  No contractions, vaginal bleeding, or leakage of fluid.  Reports normal bowel and bladder functions  Questions answered.    O: /70   Wt 63.5 kg (140 lb)   LMP 06/15/2019   BMI 23.30 kg/m²   Patients' weight gain, fluid intake and exercise level discussed.  Vitals, fundal height , fetal position, and FHR reviewed on flowsheet    SVE: 1-2/50%/-2    Lab:No results found for this or any previous visit (from the past 336 hour(s)).    A/P:  29 y.o.  at 36w4d presents for routine obstetric follow-up.  Doing well  Size equals dates     1.  Continue prenatal vitamins.  2.  Fetal kick counts daily reviewed.  3.  Exercise at least 30 minutes daily.  4.  Drink at least 2L of water daily  5.  Pregnancy and labor precautions educated.  6.  Follow-up in 1 week.  7.  GBS culture obtained today

## 2020-03-07 LAB — GP B STREP DNA SPEC QL NAA+PROBE: NEGATIVE

## 2020-03-19 ENCOUNTER — ROUTINE PRENATAL (OUTPATIENT)
Dept: OBGYN | Facility: CLINIC | Age: 30
End: 2020-03-19
Payer: COMMERCIAL

## 2020-03-19 VITALS — DIASTOLIC BLOOD PRESSURE: 82 MMHG | WEIGHT: 143 LBS | SYSTOLIC BLOOD PRESSURE: 132 MMHG | BODY MASS INDEX: 23.8 KG/M2

## 2020-03-19 DIAGNOSIS — O09.93 SUPERVISION OF HIGH RISK PREGNANCY IN THIRD TRIMESTER: ICD-10-CM

## 2020-03-19 PROCEDURE — 90040 PR PRENATAL FOLLOW UP: CPT | Performed by: OBSTETRICS & GYNECOLOGY

## 2020-03-19 NOTE — PROGRESS NOTES
Pt here today for OB follow up  Pt states she is having irregular cntractios  Reports +FM  Good # [phone]  Pharmacy Confirmed.  Chaperone offered and declined.   GBS neg and wants to be checked

## 2020-03-19 NOTE — PROGRESS NOTES
29 y.o.  38w4d The patient is here for routine obstetrical followup. She reports good fetal movement. She denies  vaginal bleeding, or leaking of fluid.  She complains of irregular contractions.  She reports no seizure activity for greater than 1 year.  She denies headaches or scotoma.    The patient's pregnancy is complicated by   Patient Active Problem List    Diagnosis Date Noted   • Encounter for supervision of other normal pregnancy, third trimester 2020   • Chlamydia needs test of cure 10/16/2019   • Seizure cerebral 2019   • Complicated migraine 2019   GBS neg      Followup in 1 week  Labor precautions were discussed with patient  Fetal kick counts were discussed with patient

## 2020-03-25 ENCOUNTER — TELEPHONE (OUTPATIENT)
Dept: OBGYN | Facility: CLINIC | Age: 30
End: 2020-03-25

## 2020-03-25 NOTE — TELEPHONE ENCOUNTER
Patient called and was wanting a IOL placed. I informed her that she needs to come to her appointment tomorrow so that the doctor is able to check her cervix and know when and which IOL to get her scheduled for. Patient understood and had no further questions.

## 2020-03-26 ENCOUNTER — ROUTINE PRENATAL (OUTPATIENT)
Dept: OBGYN | Facility: CLINIC | Age: 30
End: 2020-03-26
Payer: COMMERCIAL

## 2020-03-26 VITALS — SYSTOLIC BLOOD PRESSURE: 114 MMHG | WEIGHT: 147 LBS | DIASTOLIC BLOOD PRESSURE: 80 MMHG | BODY MASS INDEX: 24.46 KG/M2

## 2020-03-26 DIAGNOSIS — Z34.83 ENCOUNTER FOR SUPERVISION OF OTHER NORMAL PREGNANCY, THIRD TRIMESTER: ICD-10-CM

## 2020-03-26 DIAGNOSIS — O09.293 SMALL FOR DATES INFANT IN PRIOR PREGNANCY, CURRENTLY PREGNANT IN THIRD TRIMESTER: ICD-10-CM

## 2020-03-26 PROCEDURE — 90040 PR PRENATAL FOLLOW UP: CPT | Performed by: OBSTETRICS & GYNECOLOGY

## 2020-03-26 NOTE — PROGRESS NOTES
S: Pt presents for routine OB follow up.  No contractions, vaginal bleeding, or leaking fluids. Good fetal movement.    Questions answered.    O: /80   Wt 66.7 kg (147 lb)   LMP 06/15/2019   BMI 24.46 kg/m²   Patients' weight gain, fluid intake and exercise level discussed.  Vitals, fundal height , fetal position, and FHR reviewed on flowsheet    Lab:No results found for this or any previous visit (from the past 336 hour(s)).    A/P:  29 y.o.  at 39w4d presents for routine obstetric follow-up.  Size equals dates and/or scan    1.  Continue prenatal vitamins.  2.  Begin kick counts at 28 weeks.  3.  Exercise at least 30 minutes daily.  4.  Drink at least 2 Liters of water daily  5.  Follow-up in 1 weeks.

## 2020-03-26 NOTE — PROGRESS NOTES
Pt here today for OB follow up  Pt states having louie latham   Reports +FM  Good # [phone]  Pharmacy Confirmed.  Chaperone offered and provided.   Pt desires cervical check and membrane sweep

## 2020-03-27 ENCOUNTER — TELEPHONE (OUTPATIENT)
Dept: OBGYN | Facility: CLINIC | Age: 30
End: 2020-03-27

## 2020-03-27 ENCOUNTER — HOSPITAL ENCOUNTER (OUTPATIENT)
Dept: RADIOLOGY | Facility: MEDICAL CENTER | Age: 30
End: 2020-03-27
Attending: OBSTETRICS & GYNECOLOGY
Payer: COMMERCIAL

## 2020-03-27 DIAGNOSIS — O09.293 SMALL FOR DATES INFANT IN PRIOR PREGNANCY, CURRENTLY PREGNANT IN THIRD TRIMESTER: ICD-10-CM

## 2020-03-27 PROCEDURE — 76816 OB US FOLLOW-UP PER FETUS: CPT

## 2020-03-27 NOTE — TELEPHONE ENCOUNTER
Pt LM on VM inquiring about US results  Informed pt results have not been reviewed but will call pt back once reviewed by provider. Pt understood and voiced understanding

## 2020-03-27 NOTE — TELEPHONE ENCOUNTER
Called the patient back.  We discussed her ultrasound results are consistent with constitutionally small baby.  We discussed that IUGR is considered less than 10% and her baby is currently measuring 12%.  We discussed her baby is measuring 3029 g which is roughly about 6-1/2 pounds.  I advised her that her baby is symmetrically small which is reassuring.  I also discussed the finding of a fetal pericardial effusion measuring just above the upper limit of normal.  Given that there is no other areas of fluid collection, this is likely a benign finding.  The patient was put in for an elective induction of labor and upon research she is scheduled for March 31 at 9 AM.  This information was given to the patient.  She voices no further concerns.

## 2020-03-29 ENCOUNTER — ANESTHESIA (OUTPATIENT)
Dept: ANESTHESIOLOGY | Facility: MEDICAL CENTER | Age: 30
End: 2020-03-29
Payer: COMMERCIAL

## 2020-03-29 ENCOUNTER — HOSPITAL ENCOUNTER (INPATIENT)
Facility: MEDICAL CENTER | Age: 30
LOS: 1 days | End: 2020-03-30
Attending: OBSTETRICS & GYNECOLOGY | Admitting: OBSTETRICS & GYNECOLOGY
Payer: COMMERCIAL

## 2020-03-29 ENCOUNTER — ANESTHESIA EVENT (OUTPATIENT)
Dept: ANESTHESIOLOGY | Facility: MEDICAL CENTER | Age: 30
End: 2020-03-29
Payer: COMMERCIAL

## 2020-03-29 LAB
BASOPHILS # BLD AUTO: 0.2 % (ref 0–1.8)
BASOPHILS # BLD: 0.02 K/UL (ref 0–0.12)
EOSINOPHIL # BLD AUTO: 0.06 K/UL (ref 0–0.51)
EOSINOPHIL NFR BLD: 0.6 % (ref 0–6.9)
ERYTHROCYTE [DISTWIDTH] IN BLOOD BY AUTOMATED COUNT: 42.8 FL (ref 35.9–50)
ERYTHROCYTE [DISTWIDTH] IN BLOOD BY AUTOMATED COUNT: 44.1 FL (ref 35.9–50)
HCT VFR BLD AUTO: 36.6 % (ref 37–47)
HCT VFR BLD AUTO: 41.8 % (ref 37–47)
HGB BLD-MCNC: 12.1 G/DL (ref 12–16)
HGB BLD-MCNC: 14.3 G/DL (ref 12–16)
HOLDING TUBE BB 8507: NORMAL
IMM GRANULOCYTES # BLD AUTO: 0.05 K/UL (ref 0–0.11)
IMM GRANULOCYTES NFR BLD AUTO: 0.5 % (ref 0–0.9)
LYMPHOCYTES # BLD AUTO: 2.44 K/UL (ref 1–4.8)
LYMPHOCYTES NFR BLD: 23.2 % (ref 22–41)
MCH RBC QN AUTO: 30.2 PG (ref 27–33)
MCH RBC QN AUTO: 30.3 PG (ref 27–33)
MCHC RBC AUTO-ENTMCNC: 33.1 G/DL (ref 33.6–35)
MCHC RBC AUTO-ENTMCNC: 34.2 G/DL (ref 33.6–35)
MCV RBC AUTO: 88.6 FL (ref 81.4–97.8)
MCV RBC AUTO: 91.3 FL (ref 81.4–97.8)
MONOCYTES # BLD AUTO: 0.68 K/UL (ref 0–0.85)
MONOCYTES NFR BLD AUTO: 6.5 % (ref 0–13.4)
NEUTROPHILS # BLD AUTO: 7.27 K/UL (ref 2–7.15)
NEUTROPHILS NFR BLD: 69 % (ref 44–72)
NRBC # BLD AUTO: 0 K/UL
NRBC BLD-RTO: 0 /100 WBC
PLATELET # BLD AUTO: 152 K/UL (ref 164–446)
PLATELET # BLD AUTO: 186 K/UL (ref 164–446)
PMV BLD AUTO: 10.4 FL (ref 9–12.9)
PMV BLD AUTO: 11 FL (ref 9–12.9)
RBC # BLD AUTO: 4.01 M/UL (ref 4.2–5.4)
RBC # BLD AUTO: 4.72 M/UL (ref 4.2–5.4)
WBC # BLD AUTO: 10.5 K/UL (ref 4.8–10.8)
WBC # BLD AUTO: 12.7 K/UL (ref 4.8–10.8)

## 2020-03-29 PROCEDURE — 304965 HCHG RECOVERY SERVICES

## 2020-03-29 PROCEDURE — 700111 HCHG RX REV CODE 636 W/ 250 OVERRIDE (IP)

## 2020-03-29 PROCEDURE — 303615 HCHG EPIDURAL/SPINAL ANESTHESIA FOR LABOR

## 2020-03-29 PROCEDURE — 87491 CHLMYD TRACH DNA AMP PROBE: CPT

## 2020-03-29 PROCEDURE — 770002 HCHG ROOM/CARE - OB PRIVATE (112)

## 2020-03-29 PROCEDURE — 700102 HCHG RX REV CODE 250 W/ 637 OVERRIDE(OP): Performed by: OBSTETRICS & GYNECOLOGY

## 2020-03-29 PROCEDURE — 87591 N.GONORRHOEAE DNA AMP PROB: CPT

## 2020-03-29 PROCEDURE — 700111 HCHG RX REV CODE 636 W/ 250 OVERRIDE (IP): Performed by: OBSTETRICS & GYNECOLOGY

## 2020-03-29 PROCEDURE — 700105 HCHG RX REV CODE 258

## 2020-03-29 PROCEDURE — A9270 NON-COVERED ITEM OR SERVICE: HCPCS | Performed by: OBSTETRICS & GYNECOLOGY

## 2020-03-29 PROCEDURE — 59400 OBSTETRICAL CARE: CPT | Performed by: OBSTETRICS & GYNECOLOGY

## 2020-03-29 PROCEDURE — 85025 COMPLETE CBC W/AUTO DIFF WBC: CPT

## 2020-03-29 PROCEDURE — 700101 HCHG RX REV CODE 250: Performed by: ANESTHESIOLOGY

## 2020-03-29 PROCEDURE — 59409 OBSTETRICAL CARE: CPT

## 2020-03-29 PROCEDURE — 700111 HCHG RX REV CODE 636 W/ 250 OVERRIDE (IP): Performed by: ANESTHESIOLOGY

## 2020-03-29 PROCEDURE — 85027 COMPLETE CBC AUTOMATED: CPT

## 2020-03-29 PROCEDURE — 36415 COLL VENOUS BLD VENIPUNCTURE: CPT

## 2020-03-29 RX ORDER — SODIUM CHLORIDE, SODIUM LACTATE, POTASSIUM CHLORIDE, CALCIUM CHLORIDE 600; 310; 30; 20 MG/100ML; MG/100ML; MG/100ML; MG/100ML
INJECTION, SOLUTION INTRAVENOUS
Status: COMPLETED
Start: 2020-03-29 | End: 2020-03-29

## 2020-03-29 RX ORDER — ROPIVACAINE HYDROCHLORIDE 2 MG/ML
INJECTION, SOLUTION EPIDURAL; INFILTRATION; PERINEURAL CONTINUOUS
Status: DISCONTINUED | OUTPATIENT
Start: 2020-03-29 | End: 2020-03-29

## 2020-03-29 RX ORDER — DOCUSATE SODIUM 100 MG/1
100 CAPSULE, LIQUID FILLED ORAL 2 TIMES DAILY PRN
Status: DISCONTINUED | OUTPATIENT
Start: 2020-03-29 | End: 2020-03-30 | Stop reason: HOSPADM

## 2020-03-29 RX ORDER — OXYCODONE HYDROCHLORIDE AND ACETAMINOPHEN 5; 325 MG/1; MG/1
1 TABLET ORAL EVERY 4 HOURS PRN
Status: DISCONTINUED | OUTPATIENT
Start: 2020-03-29 | End: 2020-03-30 | Stop reason: HOSPADM

## 2020-03-29 RX ORDER — LIDOCAINE HYDROCHLORIDE AND EPINEPHRINE 15; 5 MG/ML; UG/ML
INJECTION, SOLUTION EPIDURAL PRN
Status: DISCONTINUED | OUTPATIENT
Start: 2020-03-29 | End: 2020-03-29 | Stop reason: SURG

## 2020-03-29 RX ORDER — ONDANSETRON 4 MG/1
4 TABLET, ORALLY DISINTEGRATING ORAL EVERY 6 HOURS PRN
Status: DISCONTINUED | OUTPATIENT
Start: 2020-03-29 | End: 2020-03-30 | Stop reason: HOSPADM

## 2020-03-29 RX ORDER — BISACODYL 10 MG
10 SUPPOSITORY, RECTAL RECTAL PRN
Status: DISCONTINUED | OUTPATIENT
Start: 2020-03-29 | End: 2020-03-30 | Stop reason: HOSPADM

## 2020-03-29 RX ORDER — ACETAMINOPHEN 325 MG/1
325 TABLET ORAL EVERY 4 HOURS PRN
Status: DISCONTINUED | OUTPATIENT
Start: 2020-03-29 | End: 2020-03-30 | Stop reason: HOSPADM

## 2020-03-29 RX ORDER — OXYCODONE HYDROCHLORIDE AND ACETAMINOPHEN 5; 325 MG/1; MG/1
2 TABLET ORAL EVERY 4 HOURS PRN
Status: DISCONTINUED | OUTPATIENT
Start: 2020-03-29 | End: 2020-03-30 | Stop reason: HOSPADM

## 2020-03-29 RX ORDER — SODIUM CHLORIDE, SODIUM LACTATE, POTASSIUM CHLORIDE, AND CALCIUM CHLORIDE .6; .31; .03; .02 G/100ML; G/100ML; G/100ML; G/100ML
250 INJECTION, SOLUTION INTRAVENOUS PRN
Status: DISCONTINUED | OUTPATIENT
Start: 2020-03-29 | End: 2020-03-29 | Stop reason: HOSPADM

## 2020-03-29 RX ORDER — ROPIVACAINE HYDROCHLORIDE 2 MG/ML
INJECTION, SOLUTION EPIDURAL; INFILTRATION PRN
Status: DISCONTINUED | OUTPATIENT
Start: 2020-03-29 | End: 2020-03-29 | Stop reason: SURG

## 2020-03-29 RX ORDER — IBUPROFEN 600 MG/1
600 TABLET ORAL EVERY 6 HOURS PRN
Status: DISCONTINUED | OUTPATIENT
Start: 2020-03-29 | End: 2020-03-30 | Stop reason: HOSPADM

## 2020-03-29 RX ORDER — ONDANSETRON 2 MG/ML
4 INJECTION INTRAMUSCULAR; INTRAVENOUS EVERY 6 HOURS PRN
Status: DISCONTINUED | OUTPATIENT
Start: 2020-03-29 | End: 2020-03-30 | Stop reason: HOSPADM

## 2020-03-29 RX ORDER — SODIUM CHLORIDE, SODIUM LACTATE, POTASSIUM CHLORIDE, CALCIUM CHLORIDE 600; 310; 30; 20 MG/100ML; MG/100ML; MG/100ML; MG/100ML
INJECTION, SOLUTION INTRAVENOUS PRN
Status: DISCONTINUED | OUTPATIENT
Start: 2020-03-29 | End: 2020-03-30 | Stop reason: HOSPADM

## 2020-03-29 RX ORDER — SODIUM CHLORIDE, SODIUM LACTATE, POTASSIUM CHLORIDE, CALCIUM CHLORIDE 600; 310; 30; 20 MG/100ML; MG/100ML; MG/100ML; MG/100ML
INJECTION, SOLUTION INTRAVENOUS CONTINUOUS
Status: ACTIVE | OUTPATIENT
Start: 2020-03-29 | End: 2020-03-29

## 2020-03-29 RX ORDER — METHYLERGONOVINE MALEATE 0.2 MG/ML
0.2 INJECTION INTRAVENOUS
Status: DISCONTINUED | OUTPATIENT
Start: 2020-03-29 | End: 2020-03-30 | Stop reason: HOSPADM

## 2020-03-29 RX ORDER — MISOPROSTOL 200 UG/1
800 TABLET ORAL
Status: DISCONTINUED | OUTPATIENT
Start: 2020-03-29 | End: 2020-03-29 | Stop reason: HOSPADM

## 2020-03-29 RX ORDER — VITAMIN A ACETATE, BETA CAROTENE, ASCORBIC ACID, CHOLECALCIFEROL, .ALPHA.-TOCOPHEROL ACETATE, DL-, THIAMINE MONONITRATE, RIBOFLAVIN, NIACINAMIDE, PYRIDOXINE HYDROCHLORIDE, FOLIC ACID, CYANOCOBALAMIN, CALCIUM CARBONATE, FERROUS FUMARATE, ZINC OXIDE, CUPRIC OXIDE 3080; 12; 120; 400; 1; 1.84; 3; 20; 22; 920; 25; 200; 27; 10; 2 [IU]/1; UG/1; MG/1; [IU]/1; MG/1; MG/1; MG/1; MG/1; MG/1; [IU]/1; MG/1; MG/1; MG/1; MG/1; MG/1
1 TABLET, FILM COATED ORAL EVERY MORNING
Status: DISCONTINUED | OUTPATIENT
Start: 2020-03-29 | End: 2020-03-30 | Stop reason: HOSPADM

## 2020-03-29 RX ORDER — SODIUM CHLORIDE, SODIUM LACTATE, POTASSIUM CHLORIDE, AND CALCIUM CHLORIDE .6; .31; .03; .02 G/100ML; G/100ML; G/100ML; G/100ML
1000 INJECTION, SOLUTION INTRAVENOUS
Status: DISCONTINUED | OUTPATIENT
Start: 2020-03-29 | End: 2020-03-29 | Stop reason: HOSPADM

## 2020-03-29 RX ORDER — ALUMINA, MAGNESIA, AND SIMETHICONE 2400; 2400; 240 MG/30ML; MG/30ML; MG/30ML
30 SUSPENSION ORAL EVERY 6 HOURS PRN
Status: DISCONTINUED | OUTPATIENT
Start: 2020-03-29 | End: 2020-03-29 | Stop reason: HOSPADM

## 2020-03-29 RX ORDER — ROPIVACAINE HYDROCHLORIDE 2 MG/ML
INJECTION, SOLUTION EPIDURAL; INFILTRATION; PERINEURAL
Status: COMPLETED
Start: 2020-03-29 | End: 2020-03-29

## 2020-03-29 RX ADMIN — VITAMIN A, VITAMIN C, VITAMIN D-3, VITAMIN E, VITAMIN B-1, VITAMIN B-2, NIACIN, VITAMIN B-6, CALCIUM, IRON, ZINC, COPPER 1 TABLET: 4000; 120; 400; 22; 1.84; 3; 20; 10; 1; 12; 200; 27; 25; 2 TABLET ORAL at 12:48

## 2020-03-29 RX ADMIN — LIDOCAINE HYDROCHLORIDE,EPINEPHRINE BITARTRATE 5 ML: 15; .005 INJECTION, SOLUTION EPIDURAL; INFILTRATION; INTRACAUDAL; PERINEURAL at 05:50

## 2020-03-29 RX ADMIN — SODIUM CHLORIDE, POTASSIUM CHLORIDE, SODIUM LACTATE AND CALCIUM CHLORIDE 1000 ML: 600; 310; 30; 20 INJECTION, SOLUTION INTRAVENOUS at 05:30

## 2020-03-29 RX ADMIN — SODIUM CHLORIDE, POTASSIUM CHLORIDE, SODIUM LACTATE AND CALCIUM CHLORIDE 1000 ML: 600; 310; 30; 20 INJECTION, SOLUTION INTRAVENOUS at 05:49

## 2020-03-29 RX ADMIN — IBUPROFEN 600 MG: 600 TABLET ORAL at 12:48

## 2020-03-29 RX ADMIN — ROPIVACAINE HYDROCHLORIDE 10 ML: 2 INJECTION, SOLUTION EPIDURAL; INFILTRATION at 05:52

## 2020-03-29 RX ADMIN — Medication 2000 ML/HR: at 09:47

## 2020-03-29 RX ADMIN — OXYTOCIN 125 ML/HR: 10 INJECTION, SOLUTION INTRAMUSCULAR; INTRAVENOUS at 12:49

## 2020-03-29 RX ADMIN — OXYCODONE HYDROCHLORIDE AND ACETAMINOPHEN 1 TABLET: 5; 325 TABLET ORAL at 17:19

## 2020-03-29 RX ADMIN — ROPIVACAINE HYDROCHLORIDE 200 MG: 2 INJECTION, SOLUTION EPIDURAL; INFILTRATION at 06:07

## 2020-03-29 SDOH — ECONOMIC STABILITY: FOOD INSECURITY: WITHIN THE PAST 12 MONTHS, THE FOOD YOU BOUGHT JUST DIDN'T LAST AND YOU DIDN'T HAVE MONEY TO GET MORE.: PATIENT DECLINED

## 2020-03-29 SDOH — ECONOMIC STABILITY: FOOD INSECURITY: WITHIN THE PAST 12 MONTHS, YOU WORRIED THAT YOUR FOOD WOULD RUN OUT BEFORE YOU GOT MONEY TO BUY MORE.: PATIENT DECLINED

## 2020-03-29 SDOH — ECONOMIC STABILITY: TRANSPORTATION INSECURITY
IN THE PAST 12 MONTHS, HAS LACK OF TRANSPORTATION KEPT YOU FROM MEETINGS, WORK, OR FROM GETTING THINGS NEEDED FOR DAILY LIVING?: PATIENT DECLINED

## 2020-03-29 SDOH — ECONOMIC STABILITY: TRANSPORTATION INSECURITY
IN THE PAST 12 MONTHS, HAS THE LACK OF TRANSPORTATION KEPT YOU FROM MEDICAL APPOINTMENTS OR FROM GETTING MEDICATIONS?: PATIENT DECLINED

## 2020-03-29 ASSESSMENT — EDINBURGH POSTNATAL DEPRESSION SCALE (EPDS)
I HAVE BEEN SO UNHAPPY THAT I HAVE BEEN CRYING: NO, NEVER
THINGS HAVE BEEN GETTING ON TOP OF ME: NO, MOST OF THE TIME I HAVE COPED QUITE WELL
I HAVE FELT SAD OR MISERABLE: NOT VERY OFTEN
I HAVE LOOKED FORWARD WITH ENJOYMENT TO THINGS: AS MUCH AS I EVER DID
I HAVE BLAMED MYSELF UNNECESSARILY WHEN THINGS WENT WRONG: NOT VERY OFTEN
I HAVE BEEN SO UNHAPPY THAT I HAVE HAD DIFFICULTY SLEEPING: NOT AT ALL
I HAVE BEEN ABLE TO LAUGH AND SEE THE FUNNY SIDE OF THINGS: AS MUCH AS I ALWAYS COULD
I HAVE FELT SCARED OR PANICKY FOR NO GOOD REASON: NO, NOT MUCH
I HAVE BEEN ANXIOUS OR WORRIED FOR NO GOOD REASON: YES, SOMETIMES
THE THOUGHT OF HARMING MYSELF HAS OCCURRED TO ME: NEVER

## 2020-03-29 NOTE — ANESTHESIA PROCEDURE NOTES
Epidural Block  Date/Time: 3/29/2020 5:43 AM  Performed by: Dean Jiménez M.D.  Authorized by: Dean Jiménez M.D.     Patient Location:  OB  Start Time:  3/29/2020 5:43 AM  End Time:  3/29/2020 5:48 AM  Reason for Block: labor analgesia    patient identified, IV checked, site marked, risks and benefits discussed, surgical consent, monitors and equipment checked, pre-op evaluation and timeout performed    Patient Position:  Sitting  Prep: ChloraPrep, patient draped and sterile technique    Monitoring:  Blood pressure, continuous pulse oximetry and heart rate  Approach:  Midline  Location:  L3-L4  Injection Technique:  EMILY saline  Skin infiltration:  Lidocaine  Strength:  1%  Dose:  3ml  Needle Type:  Tuohy  Needle Gauge:  17 G  Needle Length:  3.5 in  Loss of resistance::  3.5  Catheter Size:  19 G  Catheter at Skin Depth:  10  Test Dose Result:  Negative

## 2020-03-29 NOTE — ANESTHESIA TIME REPORT
Anesthesia Start and Stop Event Times     Date Time Event    3/29/2020 0542 Ready for Procedure     0542 Anesthesia Start     0937 Anesthesia Stop        Responsible Staff  03/29/20    Name Role Begin End    Dean Jiménez M.D. Anesth 0542 0658    Kenneth Driscoll M.D. Anesth 0658 0937        Preop Diagnosis (Free Text):  Pre-op Diagnosis             Preop Diagnosis (Codes):    Post op Diagnosis  Vaginal delivery      Premium Reason  A. 3PM - 7AM    Comments:

## 2020-03-29 NOTE — ANESTHESIA QCDR
2019 [facility] Clinical Data Registry (for Quality Improvement)     Postoperative nausea/vomiting risk protocol (Adult = 18 yrs and Pediatric 3-17 yrs)- (430 and 463)  General inhalation anesthetic (NOT TIVA) with PONV risk factors: No  Provision of anti-emetic therapy with at least 2 different classes of agents: N/A  Patient DID NOT receive anti-emetic therapy and reason is documented in Medical Record: N/A    Multimodal Pain Management- (477)  Non-emergent surgery AND patient age >= 18: No  Use of Multimodal Pain Management, two or more drugs and/or interventions, NOT including systemic opioids:   Exception: Documented allergy to multiple classes of analgesics:     Smoking Abstinence (404)  Patient is current smoker (cigarette, pipe, e-cig, marijuanna): No  Elective Surgery:   Abstinence instructions provided prior to day of surgery:   Patient abstained from smoking on day of surgery:     Pre-Op Beta-Blocker in Isolated CABG (44)  Isolated CABG AND patient age >= 18: No  Beta-blocker admin within 24 hours of surgical incision:   Exception:of medical reason(s) for not administering beta blocker within 24 hours prior to surgical incision (e.g., not  indicated,other medical reason):     PACU assessment of acute postoperative pain prior to Anesthesia Care End- Applies to Patients Age = 18- (ABG7)  Initial PACU pain score is which of the following: < 7/10  Patient unable to report pain score: N/A    Post-anesthetic transfer of care checklist/protocol to PACU/ICU- (426 and 427)  Upon conclusion of case, patient transferred to which of the following locations: PACU/Non-ICU  Use of transfer checklist/protocol: Yes  Exclusion: Service Performed in Patient Hospital Room (and thus did not require transfer): N/A  Unplanned admission to ICU related to anesthesia service up through end of PACU care- (MD51)  Unplanned admission to ICU (not initially anticipated at anesthesia start time): No

## 2020-03-29 NOTE — ANESTHESIA POSTPROCEDURE EVALUATION
Patient: Carol Buitrago    Procedure Summary     Date:  03/29/20 Room / Location:      Anesthesia Start:  0542 Anesthesia Stop:  0937    Procedure:  Labor Epidural Diagnosis:      Scheduled Providers:   Responsible Provider:  Kenneth Driscoll M.D.    Anesthesia Type:  epidural ASA Status:  2          Final Anesthesia Type: epidural  Last vitals  BP   Blood Pressure: 126/77    Temp   36.9 °C (98.4 °F)    Pulse   Pulse: 74   Resp   17    SpO2   99 %      Anesthesia Post Evaluation      Airway patency: patent  Anesthetic complications: no  Cardiovascular status: hemodynamically stable  Respiratory status: acceptable  Hydration status: stable

## 2020-03-29 NOTE — PROGRESS NOTES
0451-Pt presents to L&D c/o UC's every 4 minutes since 11 pm. Denies LOF or VB and confirms +FM. Denies any complications during this pregnancy. TOCO and EFM applied. VS taken. POC discussed.  5329-SVE as charted  0500-Phoned DIA Nichols CNM, updated on pt arrival/complaint/status. Admit orders received  0505-Report to TURNER Del Rosario RN. POC discussed. Pt transferred to S220

## 2020-03-29 NOTE — PROGRESS NOTES
0500 report from MATEO Goodwin RN, patient transferred to s220 with FOB  0547 Dr Jiménez at patients bedside, patient tolerated procedure well.   0715 SROM, SVE 7/100/0  0730 Dr Saxena at patients bedside, no questions at this time

## 2020-03-29 NOTE — CARE PLAN
Problem: Pain Management  Goal: Pain level will decrease to patient's comfort goal  Outcome: PROGRESSING AS EXPECTED  Reports mild to moderate pain, reports relief with PRN medications.      Problem: Altered physiologic condition related to immediate post-delivery state and potential for bleeding/hemorrhage  Goal: Patient physiologically stable as evidenced by normal lochia, palpable uterine involution and vital signs within normal limits  Outcome: PROGRESSING AS EXPECTED    VSS, fundus firm, light lochia.

## 2020-03-29 NOTE — H&P
History and Physical      Carol Buitrago is a 29 y.o. female  at 40w0d who presents for complaints of contractions with active labor. Her JOMAR is 3/29/20 by LMP and consistent with US at 6 3/7 week gestation. Her prenatal care was complicated by recent ultrasound revealing fetus in the 12th growth percentile with estimated weight of 3029 grams. Her recent ultrasound also revealed a small pericardial effusion. It is of note her first baby at term was 3120 grams. She had chlamydia at the beginning of her pregnancy and was treated. She subsequently had a negative test of cure. She endorses a seizure disorder with no mediations and last seizure well over a year ago.     Subjective:   positive  For CTXS.   positive Feels pain   negative for LOF  negative for vaginal bleeding.   positive for fetal movement    ROS: Pertinent items are noted in HPI.    Past Medical History:   Diagnosis Date   • Migraine    • Seizure (HCC)    • Stroke (HCC)      History reviewed. No pertinent surgical history.  OB History    Para Term  AB Living   4 1 1 0 2 1   SAB TAB Ectopic Molar Multiple Live Births   1 1 0   0 1      # Outcome Date GA Lbr Saad/2nd Weight Sex Delivery Anes PTL Lv   4 Current            3 Term 17 40w2d  3.12 kg (6 lb 14.1 oz) F Vag-Spont  N MARLIIA   2 SAB            1 TAB              Social History     Socioeconomic History   • Marital status:      Spouse name: Not on file   • Number of children: Not on file   • Years of education: Not on file   • Highest education level: Not on file   Occupational History   • Not on file   Social Needs   • Financial resource strain: Not on file   • Food insecurity     Worry: Patient refused     Inability: Patient refused   • Transportation needs     Medical: Patient refused     Non-medical: Patient refused   Tobacco Use   • Smoking status: Never Smoker   • Smokeless tobacco: Never Used   Substance and Sexual Activity   • Alcohol use: Not Currently   •  "Drug use: No   • Sexual activity: Yes     Partners: Male   Lifestyle   • Physical activity     Days per week: Not on file     Minutes per session: Not on file   • Stress: Not on file   Relationships   • Social connections     Talks on phone: Not on file     Gets together: Not on file     Attends Orthodox service: Not on file     Active member of club or organization: Not on file     Attends meetings of clubs or organizations: Not on file     Relationship status: Not on file   • Intimate partner violence     Fear of current or ex partner: Not on file     Emotionally abused: Not on file     Physically abused: Not on file     Forced sexual activity: Not on file   Other Topics Concern   • Not on file   Social History Narrative   • Not on file     Allergies: Patient has no known allergies.    Current Facility-Administered Medications:   •  LR infusion, , Intravenous, Continuous, Skip Saxena M.D.  •  fentaNYL (SUBLIMAZE) injection 50 mcg, 50 mcg, Intravenous, Q HOUR PRN, Skip Saxena M.D.  •  fentaNYL (SUBLIMAZE) injection 100 mcg, 100 mcg, Intravenous, Q HOUR PRN, Skip Saxena M.D.  •  mag hydrox-al hydrox-simeth (MAALOX PLUS ES or MYLANTA DS) suspension 30 mL, 30 mL, Oral, Q6HRS PRN, Skip Saxena M.D.  •  miSOPROStol (CYTOTEC) tablet 800 mcg, 800 mcg, Rectal, Once PRN, Skip Saxena M.D.  •  LACTATED RINGERS IV SOLN, , , ,   •  oxytocin (PITOCIN) 20 UNITS/1000ML LR, , , ,     Prenatal care with [facility] starting at 12 3/7 week gestation with following problems:  Patient Active Problem List    Diagnosis Date Noted   • Encounter for supervision of other normal pregnancy, third trimester 03/05/2020   • Chlamydia needs test of cure 10/16/2019   • Seizure cerebral 03/04/2019   • Complicated migraine 03/04/2019         Objective:      /100   Pulse (!) 116   Temp 36.9 °C (98.4 °F) (Temporal)   Resp 17   Ht 1.651 m (5' 5\")   Wt 66.7 kg (147 lb)   SpO2 97%     General:   no acute distress   Skin:   " normal   HEENT:  PERRLA   Lungs:   CTA bilateral   Heart:   S1, S2 normal   Abdomen:   gravid, NT   EFW:  3029 grams per recent ultrasound   Pelvis:  adequate with gynecoid pelvis   FHT:  140 BPM   Uterine Size: S=D   Presentations: Cephalic   Cervix:     Dilation: 7 cm    Effacement: 90    Station:  -1    Consistency: Soft    Position: Anterior     Lab Review  Lab:   Blood type: O     Recent Results (from the past 5880 hour(s))   POCT US - In Clinic OB Scan    Collection Time: 08/07/19  2:28 PM   Result Value Ref Range    In Clinic OB Scan     POCT Pregnancy    Collection Time: 08/07/19  3:56 PM   Result Value Ref Range    POC Urine Pregnancy Test positive Negative    Internal Control Positive Positive     Internal Control Negative Negative    Chlamydia/GC PCR Urine Or Swab    Collection Time: 09/18/19  4:07 PM   Result Value Ref Range    C. trachomatis by PCR POSITIVE (A) Negative    N. gonorrhoeae by PCR Negative Negative    Source Genital    AFP TETRA    Collection Time: 11/09/19  1:18 PM   Result Value Ref Range    AFP Value -Eia 100 ng/mL    AFP MOM Value 1.60     Ue3 Value 1.91 ng/mL    Ue3 Mom 0.78     Patient's hCG, 2nd Trimester 65762 IU/L    hCG MoM, 2nd Trimester 1.28     Marisel Value -Eia 112 pg/mL    Marisel Mom Value 0.60     Interpretation Screen Neg     M[age]OMAR [age] yr    Maternal Weight 126.0 lbs.     Gest. Age on Collection Date 19 wks, 6 days     Gestational Age Based On Other     Multiple Pregnancy Santos     Race Nonblack     Insulin Dependent Diabetes No     Smoking No     Family Hx NTD No     Family Hx of Aneuploidy No     Specimen See Note     EER Quad, Maternal Serum See Note    PRENATAL PANEL 3+HIV+UACXI    Collection Time: 11/09/19  1:18 PM   Result Value Ref Range    Color Yellow     Character Clear     Specific Gravity 1.031 <1.035    Ph 6.0 5.0 - 8.0    Glucose Negative Negative mg/dL    Ketones Negative Negative mg/dL    Protein Negative Negative mg/dL    Bilirubin Negative  Negative    Urobilinogen, Urine 1.0 Negative    Nitrite Negative Negative    Leukocyte Esterase Small (A) Negative    Occult Blood Negative Negative    Micro Urine Req Microscopic     WBC 8.9 4.8 - 10.8 K/uL    RBC 4.43 4.20 - 5.40 M/uL    Hemoglobin 13.4 12.0 - 16.0 g/dL    Hematocrit 39.4 37.0 - 47.0 %    MCV 88.9 81.4 - 97.8 fL    MCH 30.2 27.0 - 33.0 pg    MCHC 34.0 33.6 - 35.0 g/dL    RDW 43.6 35.9 - 50.0 fL    Platelet Count 256 164 - 446 K/uL    MPV 10.1 9.0 - 12.9 fL    Neutrophils-Polys 75.10 (H) 44.00 - 72.00 %    Lymphocytes 18.30 (L) 22.00 - 41.00 %    Monocytes 4.80 0.00 - 13.40 %    Eosinophils 1.20 0.00 - 6.90 %    Basophils 0.10 0.00 - 1.80 %    Immature Granulocytes 0.50 0.00 - 0.90 %    Nucleated RBC 0.00 /100 WBC    Neutrophils (Absolute) 6.66 2.00 - 7.15 K/uL    Lymphs (Absolute) 1.62 1.00 - 4.80 K/uL    Monos (Absolute) 0.43 0.00 - 0.85 K/uL    Eos (Absolute) 0.11 0.00 - 0.51 K/uL    Baso (Absolute) 0.01 0.00 - 0.12 K/uL    Immature Granulocytes (abs) 0.04 0.00 - 0.11 K/uL    NRBC (Absolute) 0.00 K/uL    Rubella IgG Antibody 15.80 IU/mL    Hepatitis B Surface Antigen Negative Negative    Syphilis, Treponemal Qual Non Reactive Non Reactive   HIV AG/AB COMBO ASSAY SCREENING    Collection Time: 11/09/19  1:18 PM   Result Value Ref Range    HIV Ag/Ab Combo Assay Non Reactive Non Reactive   URINE MICROSCOPIC (W/UA)    Collection Time: 11/09/19  1:18 PM   Result Value Ref Range    WBC 2-5 /hpf    RBC 0-2 /hpf    Bacteria Moderate (A) None /hpf    Epithelial Cells Few /hpf    Hyaline Cast 0-2 /lpf   OP Prenatal Panel-Blood Bank    Collection Time: 11/09/19  1:30 PM   Result Value Ref Range    ABO Grouping Only O     Rh Grouping Only POS     Antibody Screen Scrn NEG    Chlamydia/GC PCR Urine Or Swab    Collection Time: 11/13/19  3:19 PM   Result Value Ref Range    C. trachomatis by PCR Negative Negative    N. gonorrhoeae by PCR Negative Negative    Source Genital    VAGINAL PATHOGENS DNA PANEL     Collection Time: 11/13/19  3:19 PM   Result Value Ref Range    Candida species DNA Probe Negative Negative    Trichamonas vaginalis DNA Probe Negative Negative    Gardnerella vaginalis DNA Probe Negative Negative   T.PALLIDUM AB EIA    Collection Time: 12/28/19 11:15 AM   Result Value Ref Range    Syphilis, Treponemal Qual Non Reactive Non Reactive   GLUCOSE 1HR GESTATIONAL    Collection Time: 12/28/19 11:15 AM   Result Value Ref Range    Glucose, Post Dose 127 70 - 139 mg/dL   CBC WITH DIFFERENTIAL    Collection Time: 12/28/19 11:15 AM   Result Value Ref Range    WBC 5.7 4.8 - 10.8 K/uL    RBC 4.46 4.20 - 5.40 M/uL    Hemoglobin 13.5 12.0 - 16.0 g/dL    Hematocrit 41.3 37.0 - 47.0 %    MCV 92.6 81.4 - 97.8 fL    MCH 30.3 27.0 - 33.0 pg    MCHC 32.7 (L) 33.6 - 35.0 g/dL    RDW 45.7 35.9 - 50.0 fL    Platelet Count 194 164 - 446 K/uL    MPV 10.3 9.0 - 12.9 fL    Neutrophils-Polys 73.90 (H) 44.00 - 72.00 %    Lymphocytes 15.80 (L) 22.00 - 41.00 %    Monocytes 8.80 0.00 - 13.40 %    Eosinophils 0.40 0.00 - 6.90 %    Basophils 0.40 0.00 - 1.80 %    Immature Granulocytes 0.70 0.00 - 0.90 %    Nucleated RBC 0.00 /100 WBC    Neutrophils (Absolute) 4.22 2.00 - 7.15 K/uL    Lymphs (Absolute) 0.90 (L) 1.00 - 4.80 K/uL    Monos (Absolute) 0.50 0.00 - 0.85 K/uL    Eos (Absolute) 0.02 0.00 - 0.51 K/uL    Baso (Absolute) 0.02 0.00 - 0.12 K/uL    Immature Granulocytes (abs) 0.04 0.00 - 0.11 K/uL    NRBC (Absolute) 0.00 K/uL   GRP B STREP, BY PCR (GATICA BROTH)    Collection Time: 03/05/20  3:39 PM   Result Value Ref Range    Strep Gp B DNA PCR Negative Negative        Assessment:   Carol Buitrago at 40w0d  Labor status: Active phase labor.  Category one fetal heart tracing.   Obstetrical history significant for   Patient Active Problem List    Diagnosis Date Noted   • Encounter for supervision of other normal pregnancy, third trimester 03/05/2020   • Chlamydia needs test of cure 10/16/2019   • Seizure cerebral 03/04/2019   •  Complicated migraine 2019   .      Plan:     Admit to L&D, GBS negative. Anticipate . Patient is likely not appropriate candidate for estrogen contraception due to hx of complex migraines

## 2020-03-29 NOTE — ANESTHESIA PREPROCEDURE EVALUATION
Relevant Problems   NEURO   (+) Seizure cerebral (HCC)      CARDIAC   (+) Complicated migraine   Pregnancy  Labor pain    Physical Exam    Airway   Mallampati: II  TM distance: >3 FB  Neck ROM: full       Cardiovascular - normal exam  Rhythm: regular  Rate: normal  (-) murmur     Dental - normal exam         Pulmonary - normal exam  Breath sounds clear to auscultation     Abdominal    Neurological - normal exam                 Anesthesia Plan    ASA 2       Plan - epidural   Neuraxial block will be labor analgesia              Pertinent diagnostic labs and testing reviewed    Informed Consent:    Anesthetic plan and risks discussed with patient.

## 2020-03-29 NOTE — L&D DELIVERY NOTE
Delivery Note    Pre-op diagnosis:   1. IUP at 40w0d  2. Spontaneous labor  3. + chlamydia, negative DEVIN 4 months ago  4. Fetal pericardial effusion   5. Seizure disorder- untreated, last seizure more than 1 year ago  6. Small fetal pericardial effusion on ultrasound 3/27/20- no sign of hydrops  7. EFW 12%    Post operative diagnosis:   1. Same as above  2. Delivered    Delivery Course:     Patient progressed to complete and began pushing. Patient pushed for two contractions and went on to deliver a viable male  delivered over an intact perineum with no nuchal cord delivered at 0937 hour in the vertex MIGUEL ANGEL position with Apgars 8 & 8.     Anesthesia: epidural    EBL: 200    Lacerations: none    Specimen: none     Complications: none    Condition: mother and baby tolerated procedure well     [facility]

## 2020-03-29 NOTE — PROGRESS NOTES
Patient arrived to S353 with infant in arms accompanied by FOB.  Report received and bands verified with CHAN Castelan.  Patient VSS, fundus firm, light lochia.  Still awaiting post delivery void. Patient and FOB oriented to room, call light, emergency pull cord, infant safe sleep policy, infant feeding frequency, unit routines and plan of care.  Patient states understanding.  All questions answered at this time, will continue to monitor.

## 2020-03-30 VITALS
HEIGHT: 65 IN | TEMPERATURE: 97.7 F | BODY MASS INDEX: 24.49 KG/M2 | SYSTOLIC BLOOD PRESSURE: 132 MMHG | RESPIRATION RATE: 18 BRPM | WEIGHT: 147 LBS | DIASTOLIC BLOOD PRESSURE: 88 MMHG | HEART RATE: 65 BPM | OXYGEN SATURATION: 96 %

## 2020-03-30 PROCEDURE — 700102 HCHG RX REV CODE 250 W/ 637 OVERRIDE(OP): Performed by: OBSTETRICS & GYNECOLOGY

## 2020-03-30 PROCEDURE — A9270 NON-COVERED ITEM OR SERVICE: HCPCS | Performed by: OBSTETRICS & GYNECOLOGY

## 2020-03-30 RX ORDER — IBUPROFEN 600 MG/1
600 TABLET ORAL EVERY 6 HOURS PRN
Qty: 30 TAB | Refills: 0 | Status: SHIPPED | OUTPATIENT
Start: 2020-03-30 | End: 2022-04-18

## 2020-03-30 RX ORDER — VITAMIN A ACETATE, BETA CAROTENE, ASCORBIC ACID, CHOLECALCIFEROL, .ALPHA.-TOCOPHEROL ACETATE, DL-, THIAMINE MONONITRATE, RIBOFLAVIN, NIACINAMIDE, PYRIDOXINE HYDROCHLORIDE, FOLIC ACID, CYANOCOBALAMIN, CALCIUM CARBONATE, FERROUS FUMARATE, ZINC OXIDE, CUPRIC OXIDE 3080; 12; 120; 400; 1; 1.84; 3; 20; 22; 920; 25; 200; 27; 10; 2 [IU]/1; UG/1; MG/1; [IU]/1; MG/1; MG/1; MG/1; MG/1; MG/1; [IU]/1; MG/1; MG/1; MG/1; MG/1; MG/1
1 TABLET, FILM COATED ORAL EVERY MORNING
Qty: 30 TAB | Refills: 0 | Status: SHIPPED | OUTPATIENT
Start: 2020-03-31 | End: 2022-04-18

## 2020-03-30 RX ORDER — PSEUDOEPHEDRINE HCL 30 MG
100 TABLET ORAL 2 TIMES DAILY PRN
Qty: 60 CAP | Refills: 0 | Status: SHIPPED | OUTPATIENT
Start: 2020-03-30 | End: 2022-04-18

## 2020-03-30 RX ADMIN — IBUPROFEN 600 MG: 600 TABLET ORAL at 06:15

## 2020-03-30 RX ADMIN — VITAMIN A, VITAMIN C, VITAMIN D-3, VITAMIN E, VITAMIN B-1, VITAMIN B-2, NIACIN, VITAMIN B-6, CALCIUM, IRON, ZINC, COPPER 1 TABLET: 4000; 120; 400; 22; 1.84; 3; 20; 10; 1; 12; 200; 27; 25; 2 TABLET ORAL at 06:15

## 2020-03-30 NOTE — CONSULTS
Lactation note:  Initial visit. Mother states she has been able to latch infant independently, and denies pain with latch. She  her first child only two weeks. Reviewed normal  feeding behaviors and normal course of breastfeeding at 12-24-48-72 hours, and what to expect. Discussed importance of offering breast with feeding cues or at least every 2-3 hours, and even if infant shows no interest, can do hand expression into infant's lips.    Plan for tonight is to continue to offer breast first, if not latching well, can hand express colostrum, and refeed to infant.    Encouraged her to continue to work on deep latch, and skin 2 skin, with hand expression. No latch assessed at this time.  Information and phone numbers to the [facility] provided & invited to breastfeeding circles. Encouraged to check [email or web address] website for latest information on when breastfeeding circles will resume.    Parents also want circumcision. Discussed possible effects on wakefulness of infant for feedings after the procedure.    MOB has no other questions or concerns regarding breastfeeding. Encouraged to call for assistance as needed.

## 2020-03-30 NOTE — CARE PLAN
Problem: Safety  Goal: Will remain free from falls  Outcome: PROGRESSING AS EXPECTED  Note: Patient ambulating without additional assistance. Patient wearing non-slip socks and room is cluttered free. Call light and personal items all within reach. Patient reminded on call light if assistance is needed. Will continue to monitor for safety.      Problem: Potential knowledge deficit related to lack of understanding of self and  care  Goal: Patient will demonstrate ability to care for self and infant  Outcome: PROGRESSING AS EXPECTED  Note: Patient cluster feeding  and requires no additional assistance with latching at this time. Patient and FOB educated on bulb syringe and proper usage for . Education progressing as expected, no additional needs at this time. Will continue to monitor and answer questions as needed.

## 2020-03-30 NOTE — PROGRESS NOTES
Assessment complete. VSS. Fundus firm, lochia light. Pain controlled with prn medications per mar. Pt will call to request pain medications. FOB at bedside. States voiding without difficulty. POC discussed. Encouraged to call with needs. Call light in place

## 2020-03-30 NOTE — DISCHARGE INSTRUCTIONS
POSTPARTUM DISCHARGE INSTRUCTIONS FOR MOM    YOB: 1990   Age: 29 y.o.               Admit Date: 3/29/2020     Discharge Date: 3/30/2020  Attending Doctor:  Skip Saxena M.D.                  Allergies:  Patient has no known allergies.    Discharged to home by car. Discharged via wheelchair, hospital escort: Yes.  Special equipment needed: Not Applicable  Belongings with: Personal  Be sure to schedule a follow-up appointment with your primary care doctor or any specialists as instructed.     Discharge Plan:   Diet Plan: Discussed  Activity Level: Discussed  Confirmed Follow up Appointment: Patient to Call and Schedule Appointment  Confirmed Symptoms Management: Discussed  Medication Reconciliation Updated: Yes    REASONS TO CALL YOUR OBSTETRICIAN:  1.   Persistent fever or shaking chills (Temperature higher than 100.4)  2.   Heavy bleeding (soaking more than 1 pad per hour); Passing clots  3.   Foul odor from vagina  4.   Mastitis (Breast infection; breast pain, chills, fever, redness)  5.   Urinary pain, burning or frequency  6.   Episiotomy infection  7.   Abdominal incision infection  8.   Severe depression longer than 24 hours    HAND WASHING  · Prior to handling the baby.  · Before breastfeeding or bottle feeding baby.  · After using the bathroom or changing the baby's diaper.    WOUND CARE  Ask your physician for additional care instructions.  In general:    ·  Incision:      · Keep clean and dry.    · Do NOT lift anything heavier than your baby for up to 6 weeks.    · There should not be any opening or pus.      VAGINAL CARE  · Nothing inside vagina for 6 weeks: no sexual intercourse, tampons or douching.  · Bleeding may continue for 2-4 weeks.  Amount may vary.    · Call your physician for heavy bleeding which means soaking more than 1 pad per hour    BIRTH CONTROL  · It is possible to become pregnant at any time after delivery and while breastfeeding.  · Plan to discuss a method of  "birth control with your physician at your follow up visit. visit.    DIET AND ELIMINATION  · Eating more fiber (bran cereal, fruits, and vegetables) and drinking plenty of fluids will help to avoid constipation.  · Urinary frequency after childbirth is normal.    POSTPARTUM BLUES  During the first few days after birth, you may experience a sense of the \"blues\" which may include impatience, irritability or even crying.  These feeling come and go quickly.  However, as many as 1 in 10 women experience emotional symptoms known as postpartum depression.    Postpartum depression:  May start as early as the second or third day after delivery or take several weeks or months to develop.  Symptoms of \"blues\" are present, but are more intense:  Crying spells; loss of appetite; feelings of hopelessness or loss of control; fear of touching the baby; over concern or no concern at all about the baby; little or no concern about your own appearance/caring for yourself; and/or inability to sleep or excessive sleeping.  Contact your physician if you are experiencing any of these symptoms.    Crisis Hotline:  · [place] Crisis Hotline:  5-141-AHJFTTY  Or 1-300.651.5335  · [place] Crisis Hotline:  1-154.197.9899  Or 775-595-3672    PREVENTING SHAKEN BABY:  If you are angry or stressed, PUT THE BABY IN THE CRIB, step away, take some deep breaths, and wait until you are calm to care for the baby.  DO NOT SHAKE THE BABY.  You are not alone, call a supporter for help.    · Crisis Call Center 24/7 crisis line 359-756-5930 or 1-491.148.2415  · You can also text them, text \"ANSWER\" to 719798    QUIT SMOKING/TOBACCO USE:  I understand the use of any tobacco products increases my chance of suffering from future heart disease and could cause other illnesses which may shorten my life. Quitting the use of tobacco products is the single most important thing I can do to improve my health. For further information on smoking / tobacco cessation call a " Toll Free Quit Line at 1-347.694.5864 (*National Cancer [place]) or 1-147.267.6010 (American Lung Association) or you can access the web based program at www.lungusa.org.    · [place] Tobacco Users Help Line:  (567) 491-5132       Toll Free: 1-281.588.2893  · Quit Tobacco Program [facility] (870)057-7924    DEPRESSION / SUICIDE RISK:  As you are discharged from this [facility], it is important to learn how to keep safe from harming yourself.    Recognize the warning signs:  · Abrupt changes in personality, positive or negative- including increase in energy   · Giving away possessions  · Change in eating patterns- significant weight changes-  positive or negative  · Change in sleeping patterns- unable to sleep or sleeping all the time   · Unwillingness or inability to communicate  · Depression  · Unusual sadness, discouragement and loneliness  · Talk of wanting to die  · Neglect of personal appearance   · Rebelliousness- reckless behavior  · Withdrawal from people/activities they love  · Confusion- inability to concentrate     If you or a loved one observes any of these behaviors or has concerns about self-harm, here's what you can do:  · Talk about it- your feelings and reasons for harming yourself  · Remove any means that you might use to hurt yourself (examples: pills, rope, extension cords, firearm)  · Get professional help from the community (Mental Health, Substance Abuse, psychological counseling)  · Do not be alone:Call your Safe Contact- someone whom you trust who will be there for you.  · Call your local CRISIS HOTLINE 390-0427 or 396-175-3429  · Call your local Children's Mobile Crisis Response Team [place] (823) 199-4732 or www.[employer]  · Call the toll free National Suicide Prevention Hotlines   · National Suicide Prevention Lifeline 897-065-SYJH (0837)  · National Hope Line Network 800-SUICIDE (776-1952)    DISCHARGE SURVEY:  Thank you for choosing  [facility].  We hope we provided you with very good care.  You may be receiving a survey in the mail.  Please fill it out.  Your opinion is valuable to us.    ADDITIONAL EDUCATIONAL MATERIALS GIVEN TO PATIENT:        My signature on this form indicates that:  1.  I have reviewed and understand the above information  2.  My questions regarding this information have been answered to my satisfaction.  3.  I have formulated a plan with my discharge nurse to obtain my prescribed medication for home.

## 2020-03-30 NOTE — DISCHARGE SUMMARY
NORMAL SPONTANEOUS VAGINAL DISCHARGE SUMMARY    PATIENT ID:  NAME:  Carol Buitrago  MRN:               [id number]  YOB: 1990    DATE OF ADMISSION: 3/29/2020    DATE OF DISCHARGE: 3/30/2020     ADMITTING DIAGNOSIS: Intrauterine pregnancy at 40w0d.    DISCHARGE DIAGNOSIS: s/p     HOSPITAL COURSE: This is a 29 y.o. year old female admitted at  at 40w0d who presented with painful contractions, no LOF, no vaginal bleeding, normal FM and in active labor. Pt was 7 cm dilated, 90% effaced and at  -1 station on sterile vaginal exam. Pregnancy was complicated by seizure disorder without medications currently. The patient had a good labor pattern after admission and proceeded to deliver a viable male infant. Infants Apgars scores were 8 and 8 at one and five minutes. The patients postpartum course was uncomplicated and she was discharged home in stable condition on postpartum day #1.    PROCEDURES PERFORMED: Normal spontaneous vaginal delivery.    COMPLICATIONS: None    DIET: Regular    ACTIVITY: No intercourse and nothing inserted into the vagina for 6 weeks.    MEDICATIONS:  Current Outpatient Medications   Medication Sig Dispense Refill   • ibuprofen (MOTRIN) 600 MG Tab Take 1 Tab by mouth every 6 hours as needed (For cramping after delivery; do not give if patient is receiving ketorolac (Toradol)). 30 Tab 0   • [START ON 3/31/2020] prenatal plus vitamin (STUARTNATAL 1+1) 27-1 MG Tab tablet Take 1 Tab by mouth every morning. 30 Tab 0   • docusate sodium 100 MG Cap Take 100 mg by mouth 2 times a day as needed for Constipation. 60 Cap 0         FOLLOWUP:  1) The pregnancy center in 3 weeks  2) Return to the hospital if copious vaginal bleeding or foul smelling discharge is noted    Chuck Ashley M.D.

## 2020-03-30 NOTE — PROGRESS NOTES
Received report from Laura GILES. Assumed patient care. Patient assessed and rated pain 0. Patient breastfeeding . All questions and concerns answered at this time. Will continue to monitor. Bed rails up x 2, call light within reach.

## 2020-04-22 ENCOUNTER — POST PARTUM (OUTPATIENT)
Dept: OBGYN | Facility: CLINIC | Age: 30
End: 2020-04-22
Payer: COMMERCIAL

## 2020-04-22 VITALS
HEART RATE: 78 BPM | SYSTOLIC BLOOD PRESSURE: 130 MMHG | BODY MASS INDEX: 21.63 KG/M2 | DIASTOLIC BLOOD PRESSURE: 80 MMHG | WEIGHT: 130 LBS

## 2020-04-22 PROBLEM — A74.9 CHLAMYDIA: Status: RESOLVED | Noted: 2019-10-16 | Resolved: 2020-04-22

## 2020-04-22 PROCEDURE — 90050 PR POSTPARTUM VISIT: CPT | Performed by: OBSTETRICS & GYNECOLOGY

## 2020-04-22 ASSESSMENT — EDINBURGH POSTNATAL DEPRESSION SCALE (EPDS)
I HAVE LOOKED FORWARD WITH ENJOYMENT TO THINGS: AS MUCH AS I EVER DID
TOTAL SCORE: 6
I HAVE BEEN SO UNHAPPY THAT I HAVE BEEN CRYING: NO, NEVER
I HAVE FELT SAD OR MISERABLE: NOT VERY OFTEN
I HAVE BEEN ABLE TO LAUGH AND SEE THE FUNNY SIDE OF THINGS: AS MUCH AS I ALWAYS COULD
I HAVE BEEN ANXIOUS OR WORRIED FOR NO GOOD REASON: HARDLY EVER
I HAVE FELT SCARED OR PANICKY FOR NO GOOD REASON: NO, NOT MUCH
I HAVE BLAMED MYSELF UNNECESSARILY WHEN THINGS WENT WRONG: YES, SOME OF THE TIME
THE THOUGHT OF HARMING MYSELF HAS OCCURRED TO ME: NEVER
THINGS HAVE BEEN GETTING ON TOP OF ME: NO, MOST OF THE TIME I HAVE COPED QUITE WELL
I HAVE BEEN SO UNHAPPY THAT I HAVE HAD DIFFICULTY SLEEPING: NOT AT ALL

## 2020-04-22 NOTE — PROGRESS NOTES
Pt here today for postpartum exam.  Delivery Date 3/29/20   Currently: bottle feeding  BCM: pt desires parguard for BC, [facility].   Good ph:[phone]

## 2020-04-22 NOTE — PROGRESS NOTES
Post-Partum Visit    CC: post-partum    HPI: 29 y.o.  s/p vaginal, spontaneous on 3/29/2020 @ 40w0d w/ Dr. Cristobal.  No tears, no complications around delivery.     Pt reports doing well, no concerns today.  Formula feeding only.  Baby doing well, pt is getting adequate sleep.    No menses, VB stopped last week, has had intercourse x2 and used only 'pull out' method for contraception.  Thinks she would like paragard IUD for contraception.  Sex x1 last week, again yesterday.    EDPDS: 6    Last pap:2018 NILM    ROS:  gen: denies general concerns, fevers  Breast: denies pain, redness, concerns  abd: denies abd pain, GI concerns  : denies vaginal bleeding, discharge, pain    Past Medical History:   Diagnosis Date   • Migraine    • Seizure (HCC)    • Stroke (HCC)        Physical Exam:  /80   Pulse 78   Wt 59 kg (130 lb)   LMP 06/15/2019   BMI 21.63 kg/m²   gen: AAO, NAD      A/P: 29 y.o.  s/p vaginal, spontaneous  - pap up to date  - no signs of postpartum depression  - contraception: recommend plan B for PP contraception now for unprotected intercourse yesterday (too late for last week); discussed that it is impossible to know when she will start ovulating but I would definitely consider her at risk for additional pregnancy.  Desires paragard for hormone free contraception.  Discussed may have increased cramping or bleeding with menses, also discussed alternate birth control options including levonorgestrel IUD.  Order signed today for ParaGard IUD, patient to return for placement as soon as available.  Strictly instructed to use condoms for every sexual encounter to avoid pregnancy prior to ParaGard insertion, offered interim hormonal contraception which patient declines.  Pt instructed if hasn't heard about status of IUD in 2-3wks to f/u with our office    RTC for IUD insertion      Citlali Adam MD  [facility], Women's Health

## 2020-04-25 ENCOUNTER — PATIENT MESSAGE (OUTPATIENT)
Dept: OBGYN | Facility: CLINIC | Age: 30
End: 2020-04-25

## 2020-04-27 RX ORDER — MEDROXYPROGESTERONE ACETATE 150 MG/ML
150 INJECTION, SUSPENSION INTRAMUSCULAR PRN
Status: DISCONTINUED | OUTPATIENT
Start: 2020-04-27 | End: 2020-04-28

## 2020-04-27 NOTE — TELEPHONE ENCOUNTER
Pt called stating she has decided to change BC to OCPs instead of Depo Provera. Request sent to Dr. Adam

## 2020-04-28 RX ORDER — NORGESTIMATE AND ETHINYL ESTRADIOL 0.25-0.035
1 KIT ORAL DAILY
Qty: 28 TAB | Refills: 12 | Status: SHIPPED | OUTPATIENT
Start: 2020-04-28 | End: 2022-04-18

## 2020-05-22 ENCOUNTER — GYNECOLOGY VISIT (OUTPATIENT)
Dept: OBGYN | Facility: CLINIC | Age: 30
End: 2020-05-22
Payer: COMMERCIAL

## 2020-05-22 VITALS — SYSTOLIC BLOOD PRESSURE: 110 MMHG | BODY MASS INDEX: 21.8 KG/M2 | DIASTOLIC BLOOD PRESSURE: 74 MMHG | WEIGHT: 131 LBS

## 2020-05-22 DIAGNOSIS — Z30.430 ENCOUNTER FOR INSERTION OF PARAGARD IUD: ICD-10-CM

## 2020-05-22 LAB
INT CON NEG: NEGATIVE
INT CON POS: POSITIVE
POC URINE PREGNANCY TEST: NEGATIVE

## 2020-05-22 PROCEDURE — 81025 URINE PREGNANCY TEST: CPT | Performed by: OBSTETRICS & GYNECOLOGY

## 2020-05-22 PROCEDURE — 58300 INSERT INTRAUTERINE DEVICE: CPT | Performed by: OBSTETRICS & GYNECOLOGY

## 2020-05-22 RX ORDER — COPPER 313.4 MG/1
1 INTRAUTERINE DEVICE INTRAUTERINE ONCE
Status: COMPLETED | OUTPATIENT
Start: 2020-05-22 | End: 2020-05-22

## 2020-05-22 RX ADMIN — COPPER 1 EACH: 313.4 INTRAUTERINE DEVICE INTRAUTERINE at 15:59

## 2020-05-22 NOTE — NON-PROVIDER
Pt here for her IUD placement   In clinic pregnancy test: NEgative  Ph# [phone]  Pharmacy confirmed w/ pt

## 2020-05-22 NOTE — PROCEDURES
Procedure note; ParaGard IUD insertion    Informed consent obtained, consent signed-risks discussed include; risk of pain bleeding infection perforation of the uterus possible pregnancy possible irregular menstrual cycles including increased bleeding or amenorrhea. If IUD perforates the uterus, the patient will require laparoscopy to retrieve the IUD.IUD does not protect against STDs or ectopic pregnancy.    Pregnancy test negative    Bimanual exam reveals retroverted uterus nontender    Vaginal speculum placed    Betadine prep to cervix and vagina    Single-toothed tenaculum placed on the anterior lip of the cervix    Uterus sounded-9 cm and retroverted    ParaGard IUD inserted without difficulty or complications    Strings trimmed    Patient tolerated procedure well    Followup in 2 weeks for IUD check up

## 2020-07-01 ENCOUNTER — GYNECOLOGY VISIT (OUTPATIENT)
Dept: OBGYN | Facility: CLINIC | Age: 30
End: 2020-07-01
Payer: COMMERCIAL

## 2020-07-01 VITALS — BODY MASS INDEX: 21.47 KG/M2 | DIASTOLIC BLOOD PRESSURE: 82 MMHG | SYSTOLIC BLOOD PRESSURE: 126 MMHG | WEIGHT: 129 LBS

## 2020-07-01 DIAGNOSIS — Z30.431 IUD CHECK UP: ICD-10-CM

## 2020-07-01 PROCEDURE — 99213 OFFICE O/P EST LOW 20 MIN: CPT | Performed by: OBSTETRICS & GYNECOLOGY

## 2020-07-01 NOTE — NON-PROVIDER
Patient here today for Paragard check.  Paragard placed on 05/22/2020  Patient states no problems.  Phone # [phone]  Pharmacy verified.

## 2020-07-01 NOTE — PROGRESS NOTES
Chief Complaint   Patient presents with   • Gynecologic Exam     IUD check   Chief complaint: IUD check    History of present illness: 29 y.o. presents with above chief complaint. Pt had ParaGard IUD placed without any complications and presents for an IUD check up. She reports no bleeding, no pain, occasional discomfort with intercourse, no discharge. Denies fever, chills, nausea, vomiting. Overall very happy with device.    Review of systems:  Pertinent positives documented in HPI and all other systems reviewed & are negative      All PMH, PSH, allergies, social history and FH reviewed and updated today:  Past Medical History:   Diagnosis Date   • Migraine    • Seizure (HCC)    • Stroke (HCC)        History reviewed. No pertinent surgical history.    Allergies: No Known Allergies    Social History     Socioeconomic History   • Marital status:      Spouse name: Not on file   • Number of children: Not on file   • Years of education: Not on file   • Highest education level: Not on file   Occupational History   • Not on file   Social Needs   • Financial resource strain: Not on file   • Food insecurity     Worry: Patient refused     Inability: Patient refused   • Transportation needs     Medical: Patient refused     Non-medical: Patient refused   Tobacco Use   • Smoking status: Never Smoker   • Smokeless tobacco: Never Used   Substance and Sexual Activity   • Alcohol use: Not Currently   • Drug use: No   • Sexual activity: Yes     Partners: Male   Lifestyle   • Physical activity     Days per week: Not on file     Minutes per session: Not on file   • Stress: Not on file   Relationships   • Social connections     Talks on phone: Not on file     Gets together: Not on file     Attends [employer] service: Not on file     Active member of club or organization: Not on file     Attends meetings of clubs or organizations: Not on file     Relationship status: Not on file   • Intimate partner violence     Fear of current or  ex partner: Not on file     Emotionally abused: Not on file     Physically abused: Not on file     Forced sexual activity: Not on file   Other Topics Concern   • Not on file   Social History Narrative   • Not on file       Family History   Problem Relation Age of Onset   • Hypertension Father    • No Known Problems Mother    • No Known Problems Sister        Physical exam:  /82 (BP Location: Right arm, Patient Position: Sitting)   Wt 58.5 kg (129 lb)     GENERAL APPEARANCE: healthy, alert, no distress, cooperative, smiling  NECK nontender, no masses, thyromegaly or nodules  ABDOMEN Abdomen soft, non-tender. BS normal. No masses,  No organomegaly  FEMALE GYN: normal female external genitalia without lesions, no vaginal discharge noted, vulva pink without erythema or friability, urethra is normal without discharge or scarring, no bladder fullness or masses, normal vagina and normal vaginal tone, normal cervix, normal  uterus, size and consistency, IUD strings seen and palpated with normal length of strings, normal anus and perineum.  EXTREMITIES:negative clubbing, cyanosis, edema    NEURO Awake, alert and oriented x 3, Normal gait, no sensory deficits  SKIN No rashes, or ulcers or lesions seen  PSYCHIATRIC: Patient shows appropriate affect, is alert and oriented x3, intact judgment and insight.      1. IUD check up       IUD in appropriate location    Spent  15 minutes in face-to-face patient contact in which greater than 50% of that visit was spent in counseling/coordination of care of IUD and normal menstrual irregularity side effects. Reminded patient to check for strings monthly and to call the office if IUD has fallen out or any other problems should arise. Also reminded patient IUD expires in 10 years.  Follow up in January 2021 for annual exam and Pap smear

## 2020-09-21 ENCOUNTER — NURSE TRIAGE (OUTPATIENT)
Dept: HEALTH INFORMATION MANAGEMENT | Facility: OTHER | Age: 30
End: 2020-09-21

## 2020-09-21 DIAGNOSIS — R05.9 COUGH: ICD-10-CM

## 2020-09-21 NOTE — TELEPHONE ENCOUNTER
1. Caller Name: Carol Buitrago           Call Back Number:   [facility] PCP or Specialty Provider: Yes         2.  In the last two weeks, has the patient had any new or worsening symptoms (not explained by alternative diagnosis)? Yes, the patient reports the following COVID-19 consistent symptoms: cough, sore throat and congestion or runny nose, for 2 days.     3.  Does patient have any comoribidities? None     4.  Has the patient traveled in the last 14 days OR had any known contact with someone who is suspected or confirmed to have COVID-19?  No.    5. Disposition: Advised to perform self care, monitor for worsening symptoms and to call back in 3 days if no improvement    Note routed to [facility] Provider: BECCA only. Patient is requesting Covid nasoswab test order.

## 2021-05-12 ENCOUNTER — OFFICE VISIT (OUTPATIENT)
Dept: URGENT CARE | Facility: CLINIC | Age: 31
End: 2021-05-12
Payer: COMMERCIAL

## 2021-05-12 VITALS
BODY MASS INDEX: 22.02 KG/M2 | SYSTOLIC BLOOD PRESSURE: 130 MMHG | DIASTOLIC BLOOD PRESSURE: 70 MMHG | OXYGEN SATURATION: 100 % | TEMPERATURE: 98.7 F | HEART RATE: 76 BPM | HEIGHT: 64 IN | WEIGHT: 129 LBS | RESPIRATION RATE: 20 BRPM
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
## 2021-05-12 DIAGNOSIS — S61.213A LACERATION OF LEFT MIDDLE FINGER WITHOUT FOREIGN BODY WITHOUT DAMAGE TO NAIL, INITIAL ENCOUNTER: ICD-10-CM

## 2021-05-12 PROCEDURE — 12001 RPR S/N/AX/GEN/TRNK 2.5CM/<: CPT | Mod: F2 | Performed by: PHYSICIAN ASSISTANT

## 2021-05-12 RX ORDER — AMOXICILLIN AND CLAVULANATE POTASSIUM 875; 125 MG/1; MG/1
1 TABLET, FILM COATED ORAL 2 TIMES DAILY
Qty: 14 TABLET | Refills: 0 | Status: SHIPPED | OUTPATIENT
Start: 2021-05-12 | End: 2021-05-19

## 2021-05-12 ASSESSMENT — PAIN SCALES - GENERAL: PAINLEVEL: 2=MINIMAL-SLIGHT

## 2021-05-13 ASSESSMENT — ENCOUNTER SYMPTOMS
FEVER: 0
FOCAL WEAKNESS: 0
CHILLS: 0
TINGLING: 0
SENSORY CHANGE: 0

## 2021-05-13 NOTE — PROGRESS NOTES
"Subjective:   Carol Buitrago is a 30 y.o. female who presents for Hand Laceration (Lt middle finger, dog bit her tip of finger tip, it came off and tried to glue it back together, stinging pain x 2 days)      HPI  Patient presents the clinic with complaints of a laceration to left middle finger 2 days ago after a dog bite.  She states the dog was her friend's dog and is up-to-date on vaccines.  She states she then super glued her skin together in hopes of repair and then placed a Band-Aid on her finger.  She is here today for wound check and removal of the bandage.  She has pain to her finger with use and palpation.  No active bleeding.  Denies any spreading redness, swelling, discharge, numbness, tingling, weakness.  Denies any fevers or chills.  Tetanus is up-to-date.    Review of Systems   Constitutional: Negative for chills and fever.   Skin:        Left middle finger laceration  Dog bite   Neurological: Negative for tingling, sensory change and focal weakness.       Medications:    • docusate sodium Caps  • ibuprofen Tabs  • norgestimate-ethinyl estradiol  • prenatal plus vitamin Tabs    Allergies: Patient has no known allergies.    Problem List: Carol Buitrago does not have any pertinent problems on file.    Surgical History:  No past surgical history on file.    Past Social Hx: Carol Buitrago  reports that she has never smoked. She has never used smokeless tobacco. She reports previous alcohol use. She reports that she does not use drugs.     Past Family Hx:  Carol Buitrago family history includes Hypertension in her father; No Known Problems in her mother and sister.     Problem list, medications, and allergies reviewed by myself today in Epic.     Objective:     /70 (BP Location: Right arm, Patient Position: Sitting, BP Cuff Size: Adult)   Pulse 76   Temp 37.1 °C (98.7 °F) (Temporal)   Resp 20   Ht 1.626 m (5' 4\")   Wt 58.5 kg (129 lb)   SpO2 100%   BMI 22.14 kg/m²     Physical " Exam  Vitals reviewed.   Constitutional:       General: She is not in acute distress.     Appearance: She is not ill-appearing or toxic-appearing.   Cardiovascular:      Rate and Rhythm: Normal rate.   Pulmonary:      Effort: Pulmonary effort is normal.   Skin:     General: Skin is warm.      Comments: See image below s/p laceration repair:   Approximately 1.25 cm full thickness U-shaped laceration/avulsion injury to the very distal left middle finger. Distal portion of laceration finger pad distracted from finger significantly exposing open wound. Minimal oozing blood. Small subungual hematoma.   TTP.   Negative surrounding erythema, significant edema, induration, crepitus, deformity, or streaking. No other signs of infection.  Full ROM  Strength 5/5 in all directions  Sensation intact.   Cap refill < 2 sec.      Neurological:      General: No focal deficit present.      Mental Status: She is alert and oriented to person, place, and time.   Psychiatric:         Mood and Affect: Mood normal.         The patient verbalized permission and consent to allow picture to be taken through [employer] Epic Mobile Application to be placed in Chart for documentation. It was explained that this picture is not saved on any personal files or albums on my mobile device. Patient verbalized understanding, permission, and agreement.       Laceration Repair Procedure    Indication: Laceration 2 days ago s/p dog bite    Location/Description: Right middle distal finger     Procedure: The patient was placed in the appropriate position and anesthesia around the laceration was obtained by infiltration using 2% Lidocaine without epinephrine digital block. After adequate anesthesia,t he area was then thoroughly irrigated with NS and hibiclens. The wound was explored and no FB identified. The laceration was partially closed loosely with 5-0 Ethilon using interrupted sutures just enough to keep finger pad flap attached to finger. There were no  additional lacerations requiring repair. The wound area was then dressed with Nonstick dressing.     Total repaired wound length: 1.25 cm.     Other Items: Suture count: 3    The patient tolerated the procedure well.    Complications: None    Assessment/Associated Orders     1. Dog bite, initial encounter  amoxicillin-clavulanate (AUGMENTIN) 875-125 MG Tab   2. Laceration of left middle finger without foreign body without damage to nail, initial encounter  amoxicillin-clavulanate (AUGMENTIN) 875-125 MG Tab       Medical Decision Making      Patient presents to the clinic with complaints of an injury to her left middle finger after dog bite 2 days ago.  See full history above.  The finger was soaked for some time and Band-Aid was eventually taken off the finger.  There was some difficulty due to the previous superglue.  Discussed with patient, she may benefit from partial laceration repair due to significant wound exposure from distal end of finger distracted.  Discussed with patient the risks and benefits.  Discussed risks of bleeding, scarring, nerve damage, infection. Patient agreed to proceed with repair.     Partial laceration repair as above. The sutures were loosely tied. Patient tolerated very well.  Her tetanus is up-to-date.  Wound care discussed.  She will be placed on Augmentin for prophylaxis due to dog bite and distal finger area.  No current signs of infection today in clinic.     Highly recommend returning in 2 days for wound check followed by suture removal in 8 to 10 days.    I personally reviewed prior external notes and test results pertinent to today's visit. Red flags discussed and indications to present to the Emergency Department. Supportive care, natural history, differential diagnoses, and indications for immediate follow-up discussed. Patient expresses understanding and agrees to plan. Patient denies any other questions or concerns.     Advised the patient to follow-up with the primary care  physician for recheck, reevaluation, and consideration of further management.    Please note that this dictation was created using voice recognition software. I have made a reasonable attempt to correct obvious errors, but I expect that there are errors of grammar and possibly content that I did not discover before finalizing the note.    This note was electronically signed by Jesus Galdamez PA-C

## 2022-03-17 ENCOUNTER — PATIENT MESSAGE (OUTPATIENT)
Dept: OBGYN | Facility: CLINIC | Age: 32
End: 2022-03-17
Payer: COMMERCIAL

## 2022-03-17 RX ORDER — METRONIDAZOLE 7.5 MG/G
1 GEL VAGINAL
Qty: 5 EACH | Refills: 0 | Status: SHIPPED | OUTPATIENT
Start: 2022-03-17 | End: 2022-03-22

## 2022-03-17 RX ORDER — NORETHINDRONE ACETATE AND ETHINYL ESTRADIOL AND FERROUS FUMARATE 1MG-20(24)
1 KIT ORAL DAILY
Qty: 90 TABLET | Refills: 0 | Status: SHIPPED | OUTPATIENT
Start: 2022-03-17 | End: 2022-04-18

## 2022-04-18 ENCOUNTER — GYNECOLOGY VISIT (OUTPATIENT)
Dept: OBGYN | Facility: CLINIC | Age: 32
End: 2022-04-18
Payer: COMMERCIAL

## 2022-04-18 ENCOUNTER — HOSPITAL ENCOUNTER (OUTPATIENT)
Facility: MEDICAL CENTER | Age: 32
End: 2022-04-18
Attending: OBSTETRICS & GYNECOLOGY
Payer: COMMERCIAL

## 2022-04-18 VITALS — BODY MASS INDEX: 21.8 KG/M2 | SYSTOLIC BLOOD PRESSURE: 126 MMHG | DIASTOLIC BLOOD PRESSURE: 79 MMHG | WEIGHT: 127 LBS

## 2022-04-18 DIAGNOSIS — Z01.419 WOMEN'S ANNUAL ROUTINE GYNECOLOGICAL EXAMINATION: Primary | ICD-10-CM

## 2022-04-18 DIAGNOSIS — N89.8 VAGINAL DRYNESS: ICD-10-CM

## 2022-04-18 DIAGNOSIS — Z12.4 SCREENING FOR MALIGNANT NEOPLASM OF CERVIX: ICD-10-CM

## 2022-04-18 DIAGNOSIS — R68.82 LOW LIBIDO: ICD-10-CM

## 2022-04-18 DIAGNOSIS — Z30.430 ENCOUNTER FOR IUD INSERTION: ICD-10-CM

## 2022-04-18 PROBLEM — Z34.83 ENCOUNTER FOR SUPERVISION OF OTHER NORMAL PREGNANCY IN THIRD TRIMESTER: Status: RESOLVED | Noted: 2020-03-05 | Resolved: 2022-04-18

## 2022-04-18 PROCEDURE — 99395 PREV VISIT EST AGE 18-39: CPT | Mod: 25 | Performed by: OBSTETRICS & GYNECOLOGY

## 2022-04-18 PROCEDURE — 88175 CYTOPATH C/V AUTO FLUID REDO: CPT

## 2022-04-18 PROCEDURE — 58300 INSERT INTRAUTERINE DEVICE: CPT | Performed by: OBSTETRICS & GYNECOLOGY

## 2022-04-18 RX ORDER — ESTRADIOL 0.1 MG/G
0.5 CREAM VAGINAL
Qty: 30 G | Refills: 0 | Status: SHIPPED | OUTPATIENT
Start: 2022-04-18

## 2022-04-18 RX ORDER — COPPER 313.4 MG/1
1 INTRAUTERINE DEVICE INTRAUTERINE ONCE
Status: COMPLETED | OUTPATIENT
Start: 2022-04-18 | End: 2022-04-18

## 2022-04-18 RX ADMIN — COPPER 1 EACH: 313.4 INTRAUTERINE DEVICE INTRAUTERINE at 12:04

## 2022-04-18 NOTE — PROGRESS NOTES
"ANNUAL GYNECOLOGY VISIT    Chief Complaint  Annual Exam and Procedure      Subjective  Carol Buitrago is a 31 y.o. female who presents today for Annual Exam.  She complains of vaginal dryness and decreased libido.  She states she and her  have intercourse \"every day\" as he has a very high sex drive. She reports she doesn't desire sex daily but reports that he is willing to \"try whatever\" to help her be adequately stimulated. She complains mostly of dryness and doesn't feel lubrication really helps.      She also wants insertion of paraguard IUD today. She had paraguard for about a year and a half but reports she pulled it out herself in January because she felt for the strings and \"felt something weird\" and thought maybe it was partially coming out.  Also had a vaginal odor at the time which seemed to resolve after removal. She otherwise had liked the method and is fine with reinsertion of it today.    Preventive Care   Immunization History   Administered Date(s) Administered   • DTP - Historical vaccine 07/08/1993, 12/07/1993, 04/19/1994   • Dtap Vaccine 05/02/1995, 12/03/1996   • HPV Quadrivalent Vaccine (GARDASIL) - HISTORICAL DATA 04/23/2010   • Hepatitis A Vaccine, Ped/Adol 09/15/2008, 04/23/2010   • Hepatitis B Vaccine Adolescent/Pediatric 08/26/1998, 12/14/1999, 08/07/2002   • Hib Vaccine (Prp-d) - HISTORICAL DATA 07/08/1993, 12/07/1993, 04/19/1994   • MMR Vaccine 12/07/1993, 12/03/1996   • Meningococcal Conjugate Vaccine MCV4 (Menactra) 04/23/2010   • OPV - HISTORICAL DATA 07/08/1993, 12/07/1993, 04/19/1994, 12/03/1996   • Tdap Vaccine 09/15/2008, 01/17/2020     Last Mammogram: n/a    Gynecology History and ROS  Current Sexual Activity: Yes  History of sexually transmitted diseases?  no  Abnormal discharge? No  Current Contraception:  spermacide    Menstrual History  Patient's last menstrual period was 03/28/2022 (exact date).  Periods are regular   Clots or heavy flow: No  Dysmenorrhea: " No  Intermenstrual bleeding/spotting: No  Significant pain with periods:No  Bothersome PMS symptoms: No  Significant Pelvic Pain: No      Pap History  Last pap smear: 2018 - wnl  History of moderate or severe dysplasia: No    Cancer Risk Assessement:  Family history of:   - Breast cancer: no   - Ovarian cancer: no   - Uterine cancer: no   - Colon cancer: no    Obstetric History  OB History    Para Term  AB Living   4 2 2 0 2 2   SAB IAB Ectopic Molar Multiple Live Births   1 1 0   0 2      # Outcome Date GA Lbr Saad/2nd Weight Sex Delivery Anes PTL Lv   4 Term 20 40w0d 09:24 / 00:13 2.995 kg (6 lb 9.6 oz) M Vag-Spont EPI N MARILIA   3 Term 17 40w2d  3.12 kg (6 lb 14.1 oz) F Vag-Spont  N MARILIA   2 SAB            1 IAB                Past Medical History  Past Medical History:   Diagnosis Date   • Migraine    • Seizure (HCC)    • Stroke (HCC)        Past Surgical History  History reviewed. No pertinent surgical history.    Social History  Social History     Socioeconomic History   • Marital status:      Spouse name: Not on file   • Number of children: Not on file   • Years of education: Not on file   • Highest education level: Not on file   Occupational History   • Not on file   Tobacco Use   • Smoking status: Never Smoker   • Smokeless tobacco: Never Used   Vaping Use   • Vaping Use: Never used   Substance and Sexual Activity   • Alcohol use: Not Currently   • Drug use: No   • Sexual activity: Yes     Partners: Male     Birth control/protection: None   Other Topics Concern   • Not on file   Social History Narrative   • Not on file     Social Determinants of Health     Financial Resource Strain: Not on file   Food Insecurity: Not on file   Transportation Needs: Not on file   Physical Activity: Not on file   Stress: Not on file   Social Connections: Not on file   Intimate Partner Violence: Not on file   Housing Stability: Not on file       Family History  Family History   Problem Relation  Age of Onset   • Hypertension Father    • No Known Problems Mother    • No Known Problems Sister        Home Medications  No current outpatient medications on file prior to visit.     No current facility-administered medications on file prior to visit.       Allergies/Reactions  No Known Allergies    ROS  Positive ROS: none  Gen: no fevers or chills, no significant weight loss or gain, excessive fatigue  Respiratory:  no cough or dyspnea  Cardiac:  no chest pain, no palpitations, no syncope  Breast: no breast discharge, pain, lump or skin changes  GI:  no heartburn, no abdominal pain, no nausea or vomiting  Urinary: no dysuria, urgency, frequency, incontinence   Psych: no depression or anxiety  Neuro: no migraines with aura, fainting spells, numbness or tingling  Extremities: no joint pain, persistently swollen ankles, recurrent leg cramps      Physical Examination:  Vital Signs:   Vitals:    04/18/22 1048   BP: 126/79   BP Location: Left arm   Patient Position: Sitting   BP Cuff Size: Adult   Weight: 57.6 kg (127 lb)     Body mass index is 21.8 kg/m².    Constitutional: The patient is well developed and well nourished.  Psychiatric: Patient is oriented to time place and person.   Skin: No rash observed.  Neck: Appears symmetric. Thyroid normal size  Respiratory: normal effort  Breast: Inspection reveals no asymetry or nipple discharge, no skin thickening, dimpling or erythema.  Palpation demonstrates no masses.  Abdomen: Soft, non-tender.  Pelvic Exam:     Vulva: external female genitalia are normal in appearance. No lesions    Urethra - no lesions, no erythema    Vagina: moist, pink, normal ruggae; thin watery discharge c/w recent intercourse    Cervix: pink, smooth, no lesions, no CMT    Uterus - non-tender, normal size, shape, contour, mobile, anteverted    Ovaries: non-tender, no appreciable masses  Pap Smear performed: Yes  Extremeties: Legs are symmetric and without tenderness. There is no edema  present.    Labs/Imaging:  No results found for: HDL, LDL  No components found for: A1C1  WBC (K/uL)   Date Value   03/29/2020 12.7 (H)     Lab Results   Component Value Date/Time    CO2 24 01/03/2017 2016    BUN 5 (L) 01/03/2017 2016     [unfilled]      Assessment & Plan  Carol Buitrago is a 31 y.o. female who presents today for Annual Gyn Exam.     1. Encounter for IUD insertion   - see procedure note. Paraguard inserted  - Consent for all Surgical, Special Diagnostic or Therapeutic Procedures  - Paragard Intrauterine Copper IUD 1 Each    2. Women's annual routine gynecological examination   - pap done    3. Vaginal dryness   - reviewed vaginal dryness and low libido in terms of expectations of  and likely normalcy in regards to patient age    - discussed likely low/no risk to try local estrogen cream to see if this will help with dryness but it will NOT help with libido  - estradiol (ESTRACE) 0.1 MG/GM vaginal cream; Insert 0.5 g into the vagina every 7 days. Use no more than twice a week  Dispense: 30 g; Refill: 0    4. Screening for malignant neoplasm of cervix  - THINPREP PAP, REFLEX HPV ON ASC-US AND ABOVE    5. Low libido   - again discussed that daily intercourse and desire for such at this age is not abnormal in a female and that pt should address with her  reasonable expectations given age, children, etc   - however, it is important to address sexual health and libido if it is important to pt.    - will try low dose E2 vaginal cream to see if lubrication concerns helps desire   - encouraged consideration for less frequency as that may help her desire if  willing to consent to this without risk of porn/infidelity etc    Recommend pt f/u in 3 mo to check on vaginal cream use (sent to compounding pharmacy)    Return: Annually or PRN    Bibi Person D.O.

## 2022-04-18 NOTE — PROGRESS NOTES
Patient here for annual exam  Last pap done/result:  LMP:3/28/22  BCM: none   Last mammogram, if applicable:  Phone number:[phone]  Pharmacy verified  IUD insert

## 2022-04-18 NOTE — PROCEDURES
IUD INSERTION PROCEDURE NOTE    Today the patient is counseled on procedure of IUD insertion. I discussed with the patient the risks of IUD including infection, risk of IUD expulsion, the risk of uterine perforation (1-2/1000, slightly higher while Bfing), risk of IUD migration or lost strings.  If the IUD does migrate the patient may require a separate procedure such as hysteroscopy or laparoscopy to remove or retrieve the migrated IUD. I also discussed the 0.1% risk of pregnancy with IUD use which coincides with an increased risk of ectopic pregnancy with IUD use.  We reviewed leaving the strings long enough to prevent disappearance however this may initially result in the possibility that partner can feel the IUD during intercourse; this typically resolves as the strings soften and tuck behind cervix. I also discussed the side effects of progestin-containing IUDs including decreased, irregular or absent menses and/or spotting.  All questions answered.  Informed consent is signed.      Patient is mid-cycle and is using spermacide and pull out method.  She was counseled that IUD placement should be deferred until pregnancy can reasonably be excluded however that Paraguard IUD can also be used an an emergency contraceptive after unprotected intercourse.  She last had intercourse this morning.  She desires to proceed with placement regardless today and is aware of the risks of pregnancy or abortive pregnancy.     Procedure  The pelvis was examined and the uterus is anteverted.  The speculum was placed.  Beta-dine was applied to the cervix.  Tenaculum was used used to grasp the cervix and provide counter traction.  The uterus was sounded with the IUD device. The device was advanced to the fundus and withdrawn 1cm and the IUD was then deployed and gently advanced to the fundus without complications then the device removed.  Type of IUD placed: Paragard      Aftercare discussed. She is to return for fever, worsening  pelvic pain, abdominal pain, syncope, unusually heavy vaginal bleeding, suspected expulsion, foul smelling vaginal discharge, or pregnancy-like symptoms.     If the patient is comfortable she may also perform a digital self examination to check for the strings or return to office in 1 mo for any concerns with IUD symptoms/placement/possible expulsion, otherwise no follow up needed.      Bibi Person D.O.

## 2022-04-19 LAB — CYTOLOGY REG CYTOL: NORMAL

## 2022-06-28 ENCOUNTER — APPOINTMENT (OUTPATIENT)
Dept: OBGYN | Facility: CLINIC | Age: 32
End: 2022-06-28
Payer: COMMERCIAL